# Patient Record
Sex: FEMALE | Race: WHITE | NOT HISPANIC OR LATINO | Employment: OTHER | URBAN - METROPOLITAN AREA
[De-identification: names, ages, dates, MRNs, and addresses within clinical notes are randomized per-mention and may not be internally consistent; named-entity substitution may affect disease eponyms.]

---

## 2017-09-06 ENCOUNTER — HOSPITAL ENCOUNTER (OUTPATIENT)
Dept: LAB | Facility: HOSPITAL | Age: 72
Setting detail: SPECIMEN
Discharge: HOME | End: 2017-09-06
Attending: INTERNAL MEDICINE | Admitting: INTERNAL MEDICINE

## 2019-08-01 RX ORDER — ESTRADIOL 0.5 MG/1
TABLET ORAL
COMMUNITY
Start: 2014-04-15

## 2019-08-01 RX ORDER — MULTIVIT,IRON,MINERALS/LUTEIN
TABLET ORAL
COMMUNITY
Start: 2014-04-15 | End: 2019-08-09

## 2019-08-01 RX ORDER — BIOTIN 1 MG
TABLET ORAL
COMMUNITY
Start: 2014-04-15 | End: 2019-08-09

## 2019-08-01 RX ORDER — CHLORAL HYDRATE 500 MG
CAPSULE ORAL
COMMUNITY
Start: 2017-09-11 | End: 2019-08-09

## 2019-08-01 RX ORDER — MULTIVITAMIN WITH IRON
TABLET ORAL
COMMUNITY
Start: 2017-09-11 | End: 2019-08-09

## 2019-08-09 ENCOUNTER — OFFICE VISIT (OUTPATIENT)
Dept: ENDOCRINOLOGY | Facility: CLINIC | Age: 74
End: 2019-08-09

## 2019-08-09 VITALS
SYSTOLIC BLOOD PRESSURE: 105 MMHG | WEIGHT: 130 LBS | DIASTOLIC BLOOD PRESSURE: 65 MMHG | OXYGEN SATURATION: 99 % | HEART RATE: 68 BPM | HEIGHT: 65 IN | BODY MASS INDEX: 21.66 KG/M2

## 2019-08-09 DIAGNOSIS — M81.0 AGE-RELATED OSTEOPOROSIS WITHOUT CURRENT PATHOLOGICAL FRACTURE: ICD-10-CM

## 2019-08-09 DIAGNOSIS — E83.51 HYPOCALCEMIA: ICD-10-CM

## 2019-08-09 DIAGNOSIS — E89.0 POSTSURGICAL HYPOTHYROIDISM: ICD-10-CM

## 2019-08-09 DIAGNOSIS — E04.2 MULTINODULAR GOITER: Primary | ICD-10-CM

## 2019-08-09 DIAGNOSIS — E05.90 HYPERTHYROIDISM: ICD-10-CM

## 2019-08-09 PROCEDURE — 99214 OFFICE O/P EST MOD 30 MIN: CPT | Performed by: INTERNAL MEDICINE

## 2019-08-09 RX ORDER — LEVOTHYROXINE SODIUM 0.03 MG/1
25 TABLET ORAL DAILY
Qty: 30 TABLET | Refills: 4 | Status: SHIPPED | OUTPATIENT
Start: 2019-08-09 | End: 2019-10-24 | Stop reason: SDUPTHER

## 2019-08-09 NOTE — PATIENT INSTRUCTIONS
Start levothyroxine 25 mcg p.o. daily  Please arrange for thyroid ultrasound in next few days  Please resume your calcium with vitamin D supplementation  Please get us a copy of your bone mineral density report  Please follow-up in 6 to 8 weeks with labs.

## 2019-08-09 NOTE — PROGRESS NOTES
Endocrine Progress Note Outpatient     Patient Care Team:  Isacc Nichols MD as PCP - General    Chief Complaint: Follow-up thyroid    HPI: 74-year-old female who was last seen here in September 2017.  She initially was seen here for hyperthyroidism due to a toxic nodule on the right side of thyroid.  She underwent right thyroidectomy on October 28, 2014 at [facility] by Dr. pancho grier.  Pathology was benign.  She became euthyroid after that and was last seen in September 17 at that time her TSH was normal.  Not on any thyroid medications.  Osteoporosis: Taking calcium with vitamin D supplementation.  She is a scheduled for a bone mineral density in September 2019.  Multiple thyroid nodules: She also have left-sided thyroid nodule at 1.2 cm and a 7 mm and a 6 mm.  Last ultrasound was 2016.  No family history of thyroid cancer and no history of radiation exposure.  She denies dysphagia or choking but she has some change in the voice or hoarseness.    Past Medical History:   Diagnosis Date   • Goiter    • Hyperthyroidism        Social History     Socioeconomic History   • Marital status:      Spouse name: Not on file   • Number of children: Not on file   • Years of education: Not on file   • Highest education level: Not on file   Tobacco Use   • Smoking status: Never Smoker   Substance and Sexual Activity   • Alcohol use: No     Frequency: Never       Family History   Problem Relation Age of Onset   • Cancer Sister         breast       No Known Allergies    ROS:   Constitutional:  Denies fatigue, tiredness.    Eyes:  Denies change in visual acuity   HENT:  Denies nasal congestion or sore throat   Respiratory: denies cough, shortness of breath.   Cardiovascular:  denies chest pain, edema   GI:  Denies abdominal pain, nausea, vomiting.   Musculoskeletal:  Denies back pain or joint pain   Integument:  Denies dry skin and rash   Neurologic:  Denies headache, focal weakness or sensory changes    Endocrine:  Denies polyuria or polydipsia   Psychiatric:  Denies depression or anxiety      Vitals:    08/09/19 0841   BP: 105/65   Pulse: 68   SpO2: 99%       Physical Exam:  GEN: NAD, conversant  EYES: EOMI, PERRL, no conjunctival erythema  NECK: Left side of thyroid is palpable, full ROM   CV: RRR, no murmurs/rubs/gallops, no peripheral edema  LUNG: CTAB, no wheezes/rales/ronchin  SKIN: no rashes, no acanthosis  MSK: no deformities, full ROM of all extremities  NEURO: no tremors, DTR normal  PSYCH: AOX3, appropriate mood, affect normal      Labs from June 13, 2019 reviewed showed a white count of 4.1, hemoglobin 13.7, have a good 40, platelet count is 154, TSH 4.6, T4 7.1, sodium 139, potassium 4.0, chloride 105, 0-85, glucose 85, CO2 29, BUN 16, creatinine 0.8, calcium was low at 8.2, MND 54.3.  Medication Review: Reviewed.       Current Outpatient Medications:   •  Calcium Carbonate-Vitamin D (CALCIUM 600+D) 600-200 MG-UNIT tablet, CALCIUM 600+D 600-200 MG-UNIT TABS, Disp: , Rfl:   •  estradiol (ESTRACE) 0.5 MG tablet, ESTRADIOL 0.5 MG TABS, Disp: , Rfl:   •  Glucosamine HCl (GLUCOSAMINE PO), GLUCOSAMINE CAPS, Disp: , Rfl:   •  Multiple Vitamins-Minerals (MACULAR VITAMIN BENEFIT PO), Take 100 mg by mouth 2 (Two) Times a Day., Disp: , Rfl:       Assessment/Plan   1. Hyperthyroidism: Secondary to right side of toxic thyroid nodule.  She is a status post surgery in 2014 and had remained euthyroid until recently when she was told that her thyroid function was off and she is here for follow-up.  We are trying to get those reports from her primary care physician's office.  2.  Hypothyroidism: She has a mild high TSH, I will add levothyroxine 25 mcg p.o. daily.  3.  Thyroid nodules: Able to palpate the left side thyroid nodule, I will get thyroid ultrasound for further evaluation  4. Osteoporosis: She is a scheduled for BMD in September 2019, she is currently taking calcium with vitamin D supplementation.  5.   "Hypocalcemia: Wise to take calcium with vitamin D supplementation and will follow CMP.            Guillermo Perdue MD FACE.  06/15/19  4:34 PM      EMR Dragon / transcription disclaimer:     \"Dictated utilizing Dragon dictation\".                 "

## 2019-08-15 ENCOUNTER — HOSPITAL ENCOUNTER (OUTPATIENT)
Dept: ULTRASOUND IMAGING | Facility: HOSPITAL | Age: 74
Discharge: HOME | End: 2019-08-15
Admitting: INTERNAL MEDICINE

## 2019-08-15 DIAGNOSIS — E05.90 HYPERTHYROIDISM: ICD-10-CM

## 2019-08-15 DIAGNOSIS — M81.0 AGE-RELATED OSTEOPOROSIS WITHOUT CURRENT PATHOLOGICAL FRACTURE: ICD-10-CM

## 2019-08-15 DIAGNOSIS — E83.51 HYPOCALCEMIA: ICD-10-CM

## 2019-08-15 DIAGNOSIS — E04.2 MULTINODULAR GOITER: ICD-10-CM

## 2019-08-15 DIAGNOSIS — E89.0 POSTSURGICAL HYPOTHYROIDISM: ICD-10-CM

## 2019-08-15 PROCEDURE — 76536 US EXAM OF HEAD AND NECK: CPT

## 2019-08-19 DIAGNOSIS — E89.0 POSTSURGICAL HYPOTHYROIDISM: ICD-10-CM

## 2019-08-19 DIAGNOSIS — E04.2 MULTINODULAR GOITER: Primary | ICD-10-CM

## 2019-08-22 ENCOUNTER — TELEPHONE (OUTPATIENT)
Dept: ENDOCRINOLOGY | Facility: CLINIC | Age: 74
End: 2019-08-22

## 2019-08-27 ENCOUNTER — TELEPHONE (OUTPATIENT)
Dept: ENDOCRINOLOGY | Facility: CLINIC | Age: 74
End: 2019-08-27

## 2019-08-27 NOTE — TELEPHONE ENCOUNTER
Called pt with appt date and time for thyroid biopsy scheduled 9/3 @ 1:00 at [facility]. Pt will need to arrive at 12:15 for regisatration and take ins cards, ID and a current med list with dosages. Pt is not on any blood thinners and was advised to not take any NSAIDS for 7 days before her appt. Scheduling documents faxed and pt given facility location and contact info.

## 2019-09-03 ENCOUNTER — HOSPITAL ENCOUNTER (OUTPATIENT)
Dept: ULTRASOUND IMAGING | Facility: HOSPITAL | Age: 74
Discharge: HOME | End: 2019-09-03
Admitting: INTERNAL MEDICINE

## 2019-09-03 DIAGNOSIS — E04.2 MULTINODULAR GOITER: ICD-10-CM

## 2019-09-03 DIAGNOSIS — E89.0 POSTSURGICAL HYPOTHYROIDISM: ICD-10-CM

## 2019-09-03 PROCEDURE — 88305 TISSUE EXAM BY PATHOLOGIST: CPT | Performed by: INTERNAL MEDICINE

## 2019-09-03 PROCEDURE — 88173 CYTOPATH EVAL FNA REPORT: CPT | Performed by: INTERNAL MEDICINE

## 2019-09-03 PROCEDURE — 25010000003 LIDOCAINE 1 % SOLUTION: Performed by: INTERNAL MEDICINE

## 2019-09-03 RX ORDER — LIDOCAINE HYDROCHLORIDE 10 MG/ML
10 INJECTION, SOLUTION INFILTRATION; PERINEURAL ONCE
Status: COMPLETED | OUTPATIENT
Start: 2019-09-03 | End: 2019-09-03

## 2019-09-03 RX ADMIN — LIDOCAINE HYDROCHLORIDE 5 ML: 10 INJECTION, SOLUTION INFILTRATION; PERINEURAL at 13:45

## 2019-09-04 LAB
LAB AP CASE REPORT: NORMAL
LAB AP DIAGNOSIS COMMENT: NORMAL
LAB AP NON-GYN SPECIMEN ADEQUACY: NORMAL
PATH REPORT.FINAL DX SPEC: NORMAL
PATH REPORT.GROSS SPEC: NORMAL

## 2019-09-11 ENCOUNTER — TELEPHONE (OUTPATIENT)
Dept: ENDOCRINOLOGY | Facility: CLINIC | Age: 74
End: 2019-09-11

## 2019-10-24 ENCOUNTER — OFFICE VISIT (OUTPATIENT)
Dept: ENDOCRINOLOGY | Facility: CLINIC | Age: 74
End: 2019-10-24

## 2019-10-24 VITALS
SYSTOLIC BLOOD PRESSURE: 112 MMHG | WEIGHT: 124 LBS | BODY MASS INDEX: 21.97 KG/M2 | HEIGHT: 63 IN | DIASTOLIC BLOOD PRESSURE: 72 MMHG

## 2019-10-24 DIAGNOSIS — E04.2 MULTINODULAR GOITER: ICD-10-CM

## 2019-10-24 DIAGNOSIS — M81.0 AGE-RELATED OSTEOPOROSIS WITHOUT CURRENT PATHOLOGICAL FRACTURE: ICD-10-CM

## 2019-10-24 DIAGNOSIS — E83.51 HYPOCALCEMIA: ICD-10-CM

## 2019-10-24 DIAGNOSIS — E89.0 POSTSURGICAL HYPOTHYROIDISM: Primary | ICD-10-CM

## 2019-10-24 PROCEDURE — 99214 OFFICE O/P EST MOD 30 MIN: CPT | Performed by: INTERNAL MEDICINE

## 2019-10-24 RX ORDER — LEVOTHYROXINE SODIUM 0.03 MG/1
25 TABLET ORAL DAILY
Qty: 90 TABLET | Refills: 4 | Status: SHIPPED | OUTPATIENT
Start: 2019-10-24 | End: 2020-10-16 | Stop reason: SDUPTHER

## 2019-10-24 RX ORDER — ALENDRONATE SODIUM 70 MG/1
70 TABLET ORAL
Qty: 4 TABLET | Refills: 11 | Status: SHIPPED | OUTPATIENT
Start: 2019-10-24 | End: 2020-10-16

## 2019-10-24 NOTE — PROGRESS NOTES
Endocrine Progress Note Outpatient     Patient Care Team:  Isacc Nichols MD as PCP - General    Chief Complaint: Follow-up thyroid    HPI: 74-year-old female who was last seen here in September 2017.  She initially was seen here for hyperthyroidism due to a toxic nodule on the right side of thyroid.  She underwent right thyroidectomy on October 28, 2014 at [facility] by Dr. Kwan.  Pathology was benign.  She developed hypothyroidism back in August 2019 with a mild high TSH, she was started on levothyroxine 25 mcg p.o. daily which she is taking now.  Denies any hypothyroid complaints at this time.    Osteoporosis: Taking calcium with vitamin D supplementation.    Multiple thyroid nodules: Ultrasound from August 2019 showed increase in the left side of thyroid nodule at 1.5 cm which she subsequently underwent biopsy and the pathology was benign.  No family history of thyroid cancer and no history of radiation exposure.  She denies dysphagia or choking but she has some change in the voice or hoarseness.    Past Medical History:   Diagnosis Date   • Goiter    • Hyperthyroidism        Social History     Socioeconomic History   • Marital status:      Spouse name: Not on file   • Number of children: Not on file   • Years of education: Not on file   • Highest education level: Not on file   Tobacco Use   • Smoking status: Never Smoker   • Smokeless tobacco: Never Used   Substance and Sexual Activity   • Alcohol use: No     Frequency: Never   • Drug use: No   • Sexual activity: Defer       Family History   Problem Relation Age of Onset   • Cancer Sister         breast       No Known Allergies    ROS:   Constitutional:  Denies fatigue, tiredness.    Eyes:  Denies change in visual acuity   HENT:  Denies nasal congestion or sore throat   Respiratory: denies cough, shortness of breath.   Cardiovascular:  denies chest pain, edema   GI:  Denies abdominal pain, nausea, vomiting.   Musculoskeletal:  Denies  back pain or joint pain   Integument:  Denies dry skin and rash   Neurologic:  Denies headache, focal weakness or sensory changes   Endocrine:  Denies polyuria or polydipsia   Psychiatric:  Denies depression or anxiety      Vitals:    10/24/19 1034   BP: 112/72       Physical Exam:  GEN: NAD, conversant  EYES: EOMI, PERRL, no conjunctival erythema  NECK: Left side of thyroid is palpable, full ROM   CV: RRR, no murmurs/rubs/gallops, no peripheral edema  LUNG: CTAB, no wheezes/rales/ronchin  SKIN: no rashes, no acanthosis  MSK: no deformities, full ROM of all extremities  NEURO: no tremors, DTR normal  PSYCH: AOX3, appropriate mood, affect normal    US Thyroid [[id number]] (Order [id number])   Order   Status: Final result   Appointment Information     PACS Images      Radiology Images   Study Result     US THYROID-     Date of Exam: 8/15/2019 8:20 AM     Indication: Thyroid nodules.     Comparison Exams: August 16, 2016     Technique: Thyroid ultrasound     FINDINGS:  The right thyroid lobe is surgically absent. The left thyroid lobe  measures 3.6 x 1.8 x 1 point centimeter. The isthmus is surgically  absent as well.     Within the lower left thyroid lobe is a mostly solid hypoechoic nodule  measuring 1.5 x 1.5 x 0.8 cm that has increased in size, previously 1.0  x 0.5 x 1.2 cm, TI-RADS 4.      In the mid to lower left thyroid lobe is a solid hypoechoic nodule  measuring 6 x 6 x 3 mm, not significantly changed, TI-RADS 4.     Within the inferior left thyroid lobe is a mixed solid and cystic nodule  measuring 1.0 x 0.9 x 0.6 cm that has slightly increased in size,  previously 0.7 x 0.5 x 0.7 cm, TI-RADS 3.     IMPRESSION:  1.Thyroid nodules as described above. A 1.5 cm left thyroid nodule has  increased in size from prior exam, and percutaneous biopsy is  recommended.  2.Status post right hemithyroidectomy.     Electronically Signed By-DR. Loco Bruce MD On:8/15/2019 9:29 AM  This report was finalized on  "[id number] by DR. Loco Bruce MD.     Fine Needle Aspiration [[id number]] (Order [id number])   Order   Date: 9/3/2019 [facility] Released By: Ivette Angel (auto-released) Authorizing: Morgan Garcia III, MD   Reprint Order Requisition     Fine Needle Aspiration (Order #[id number]) on 9/3/19       Fine Needle Aspiration: [id number]   Order: [id number]   Status:  Final result   Visible to patient:  No (Not Released)   Next appt:  None   Component    Case Report   Medical Cytology Report                           Case: [id number]                                   Authorizing Provider:  Morgan Garcia III, Collected:           09/03/2019 12:00 AM                                  MD                                                                            [facility] Received:            09/03/2019 12:56 PM                                  SOUND                                                                         Pathologist:           Loco Angela MD                                                            Specimen:    Thyroid, left 1.5 cm                                                                       Final Diagnosis   Left thyroid nodule, fine needle aspiration (smears and cell block):    Consistent with a benign follicular nodule (Hansen class II)      SALLIE/sms    Electronically signed by Loco Angela MD on 9/4/2019 at 0753   Comment    There are multiple bland follicular cell groups along with colloid and occasional foamy macrophages identified. No malignant cells are seen.    Specimen Adequacy Satisfactory for evaluation    Gross Description    Part #1: Received in 95% alcohol are four slides designated \"Fine needle aspiration of left thyroid nodule.\" These are Pap stained for evaluation. Also received are four air dried slides from the same site. These are stained with Mackey/Giemsa stain for immediate " "evaluation.     Part 2: Received in formalin designated \"Left thyroid\" is a conical-shaped portion of reddish gelatinous material measuring 1 x 1 x 0.5 cm. It is sectioned and submitted in one cassette.      [facility]           Labs from October 2019 showed a TSH of 2.54, free T4 1.03, serum calcium 8.9, BUN 14, creatinine 0.8.  Labs from June 13, 2019 reviewed showed a white count of 4.1, hemoglobin 13.7, have a good 40, platelet count is 154, TSH 4.6, T4 7.1, sodium 139, potassium 4.0, chloride 105, 0-85, glucose 85, CO2 29, BUN 16, creatinine 0.8, calcium was low at 8.2, MND 54.3.  Medication Review: Reviewed.       Current Outpatient Medications:   •  Calcium Carbonate-Vitamin D (CALCIUM 600+D) 600-200 MG-UNIT tablet, once daily, Disp: , Rfl:   •  estradiol (ESTRACE) 0.5 MG tablet, once daily, Disp: , Rfl:   •  levothyroxine (SYNTHROID, LEVOTHROID) 25 MCG tablet, Take 1 tablet by mouth Daily., Disp: 30 tablet, Rfl: 4  •  Multiple Vitamins-Minerals (MACULAR VITAMIN BENEFIT PO), Take 100 mg by mouth 2 (Two) Times a Day., Disp: , Rfl:       Assessment/Plan     1.  Hypothyroidism: She is currently on levothyroxine 25 mcg p.o. daily and TSH is normal.  We will continue levothyroxine.      2.  Thyroid nodules: Left-sided thyroid nodule had increased in size and it underwent biopsy and pathology was benign.      3. Osteoporosis: She is a scheduled for BMD in September 2019, she is currently taking calcium with vitamin D supplementation.  Her bone mineral density done in September 2019 showed a T score of -2.5 at femoral neck and a T score of -1.2 at the AP spine.  She is high risk for fracture, we talked about treatment options including oral bisphosphonates, IV bisphosphonates, IV subcutaneous Prolia.  At this time we have decided to go with oral bisphosphonate alendronate 70 mg once a week.  She is advised to take the pill by itself on an empty stomach with a full glass of water in the morning and do not lay down or " "eat anything for at least 30 minutes after she has taken the pills.  Is also advised to continue calcium and vitamin D supplementation.    4.  Hypocalcemia: On calcium and vitamin D supplementation.  Serum calcium is normal at 8.9.            Guillermo Perdue MD FACE.  06/15/19  4:34 PM      EMR Dragon / transcription disclaimer:     \"Dictated utilizing Dragon dictation\".                 "

## 2019-10-24 NOTE — PATIENT INSTRUCTIONS
Start Fosamax 70 mg once a week and follow instructions as I mentioned to you in detail  Continue levothyroxine current dose  Follow-up in 1 year with labs.    Continue calcium and vitamin D supplementation.

## 2020-02-19 ENCOUNTER — TRANSCRIBE ORDERS (OUTPATIENT)
Dept: PHYSICAL THERAPY | Facility: CLINIC | Age: 75
End: 2020-02-19

## 2020-02-19 ENCOUNTER — TREATMENT (OUTPATIENT)
Dept: PHYSICAL THERAPY | Facility: CLINIC | Age: 75
End: 2020-02-19

## 2020-02-19 DIAGNOSIS — M99.05 SEGMENTAL AND SOMATIC DYSFUNCTION OF PELVIC REGION: ICD-10-CM

## 2020-02-19 DIAGNOSIS — M25.562 CHRONIC PAIN OF BOTH KNEES: ICD-10-CM

## 2020-02-19 DIAGNOSIS — M17.11 PATELLOFEMORAL ARTHRITIS OF RIGHT KNEE: ICD-10-CM

## 2020-02-19 DIAGNOSIS — G89.29 CHRONIC PAIN OF BOTH KNEES: ICD-10-CM

## 2020-02-19 DIAGNOSIS — M17.12 PATELLOFEMORAL ARTHRITIS OF LEFT KNEE: Primary | ICD-10-CM

## 2020-02-19 DIAGNOSIS — M25.561 CHRONIC PAIN OF BOTH KNEES: ICD-10-CM

## 2020-02-19 PROCEDURE — 97162 PT EVAL MOD COMPLEX 30 MIN: CPT | Performed by: PHYSICAL THERAPIST

## 2020-02-19 PROCEDURE — 97110 THERAPEUTIC EXERCISES: CPT | Performed by: PHYSICAL THERAPIST

## 2020-02-19 NOTE — PROGRESS NOTES
Physical Therapy Initial Evaluation and Plan of Care    Patient: Elijah Westbrook   : 1945  Diagnosis/ICD-10 Code:  Patellofemoral arthritis of left knee [M17.12]  Referring practitioner: Nicole Saunders MD  Date of Initial Visit: 2020  Today's Date: 2020  Patient seen for 1 sessions           Subjective Questionnaire: LEFS: 64/80      Subjective Evaluation    History of Present Illness  Mechanism of injury: Pt reports several month hx of B ant knee pain. Pt also reports some numbness in R plantar surface of toes at times, generally with sitting (pt notes MD is not aware of that). Pt states she was walking 5 miles/day on a treadmill and it was hurting so she cut back to 2 miles/day and since she's seen MD she's increased back to 2.5 miles/day.     Pt denies falls in the last 12 months, but did come close last fall wearing milking boots on uneven ground. Pt thinks that could have been what aggravated it.     Pain: 0/10 current, 0/10 at best, 7/10 at worst    Aggravating/functional factors: sitting, twisting, rising, in/out of vehicle, up/down stairs (goes sideways at times due to pain & use of rail), squatting, bending, stooping, prolonged walking    Relieving factors: Ibuprofen, moving around    Social Hx: lives with spouse, homemaker, stairs to basement (rails) & into garage (no rail)    Meds per list in chart. Pt reports she took herself off of the Fosomax because it was bothering her stomach.     Treatments  Discharged from (in last 30 days) comments: No.     Patient Goals  Patient goals for therapy: decreased pain, increased strength, independence with ADLs/IADLs, increased motion, improved balance and return to sport/leisure activities  Patient goal: walking regimen      Objective     Vitals: /58; HR 69; SpO2 96%    Palpation: tender R popliteal fossa; no other TTP noted B LEs; L PINN; B outflares    Observation: appears lat shifted in standing/walking, crepitus with rising and some knee  movements in clinic    AROM (in degrees):  Hip flex: 115 L/110 R  Hip IR/ER: WFL  Knee flex: 110 B (heelslide)  Knee ext lag: 10 L/15 R    PROM (in degrees):   Knee ext la L/10 R    Strength (in available range):   Hip flex 4- B  Hip ext 4+ B  Hip abd/add in H/L [place] min/mod resistance  Hip IR/ER 5- B  Knee flex 4- B  Knee ext 4  Ankle DF 4+ B  Ankle IV 4 B  Ankle EV 4+ L/4- R  Gt toe ext 4+ B    Flexibility: min to min/mod restrictions throughout the hip/knee & ankle DF    Special Tests: Varus/valgus stress tests (-) B; Ant/post drawer (-); Thessaly's painful med knee L; SLR painful in LEs only & worsened with addition of DF (Braggard's) & popliteal fossa tender as well   Balance: SLS 14 s L/8 s R, level ground, no UE support; SBA/CG to catch from losing balance    Gait: no AD; normal kathleen; appears lat shifted in the spine; slight circumduction & WB through     Assessment & Plan     Assessment  Impairments: abnormal coordination, abnormal gait, abnormal muscle firing, abnormal muscle tone, abnormal or restricted ROM, activity intolerance, impaired balance, impaired physical strength, lacks appropriate home exercise program, pain with function, safety issue and weight-bearing intolerance  Assessment details: The patient is a 74 y.o. female who presents to physical therapy today for patellofemoral arthritis. Pt also appears with possible LB involvement due to severity of pain levels into LEs with SLR, numbness in R toes, and tenderness into R popliteal fossa. Upon initial evaluation, the patient demonstrates the above & following impairments: pain, reduced posture, SI/IS dysfunction, decreased ROM/flexibility, strength core/LEs, gait, balance and function. Due to these impairments, the patient is unable to/limited with: sitting, twisting, rising, in/out of vehicle, up/down stairs, squatting, bending, stooping, prolonged walking. The patient would benefit from skilled PT services to address functional  limitations and impairments and to improve patient quality of life.      Barriers to therapy: Osteopenia/OP, arthritis, hyperthyroidism & reduced balance could all affect PT Rx/progress/outcomes if exacerbated/unregulated   Prognosis: good  Functional Limitations: lifting, sleeping, walking, uncomfortable because of pain, moving in bed, sitting, standing and stooping  Goals  Plan Goals: STGs in 4 weeks:  Decrease pain to 4-5/10 on average  Increase trunk/LE ROM by 5-10 degrees where limited as much  Increase LE strength to 4/5  Assess/improve pelvic/sacral alignment prn    LTGs by discharge  Increase trunk/LE ROM to WFL/WNL  Increase LE strength to 5/5   Pt will be able to ascend/descend stairs reciprocally with or without use of rail(s) and with minimal difficulty or pain  Pt will be able to sit/drive/ride for 30-60 mins without difficulty or pain  Pt will be able to walk 30-60 mins for grocery shopping without difficulty, pain or LOB  Pt will be able to get in/out of vehicles and chairs without difficulty or increased pain  Pt will be able to bend/stoop/squat sufficiently for ADLs including house cleaning.      Plan  Therapy options: will be seen for skilled physical therapy services  Planned modality interventions: cryotherapy, thermotherapy (hydrocollator packs) and electrical stimulation/Russian stimulation  Other planned modality interventions: Dry Needling  Planned therapy interventions: manual therapy, neuromuscular re-education, postural training, strengthening, stretching, therapeutic activities, transfer training, abdominal trunk stabilization, ADL retraining, body mechanics training, home exercise program, gait training, functional ROM exercises, flexibility, soft tissue mobilization, motor coordination training and balance/weight-bearing training  Other planned therapy interventions: MET correx for improving pelvic/sacral alignment prn  Duration in visits: 12  Treatment plan discussed with:  patient      History # of Personal Factors and/or Comorbidities: HIGH (3+)  Examination of Body System(s): # of elements: HIGH (4+)  Clinical Presentation: EVOLVING  Clinical Decision Making: MODERATE      Timed:         Manual Therapy:         mins  02574;     Therapeutic Exercise:   11     mins  40976;     Neuromuscular Matthieu:        mins  43409;    Therapeutic Activity:          mins  68841;     Gait Training:           mins  57892;     Ultrasound:         mins  71226;    Ionto                                   mins   53567  Self Care                           mins   68999  Canalith Repos         mins 25364      Un-Timed:  Electrical Stimulation:         mins  71277 ( );  Dry Needling         mins self-pay  Traction          mins 87104  Low Eval          Mins  72328  Mod Eval     40     Mins  59291  High Eval                            Mins  05299  Re-Eval                               mins  02905        Timed Treatment:  11    mins   Total Treatment:     51  mins    PT SIGNATURE: Any Kamara, PT   DATE TREATMENT INITIATED: 2/19/2020    Medicare Initial Certification  Certification Period: 5/19/2020  I certify that the therapy services are furnished while this patient is under my care.  The services outlined above are required by this patient, and will be reviewed every 90 days.     PHYSICIAN:       DATE:     Please sign and return via fax to [phone]  .. Thank you, [facility], [place], [place] office.

## 2020-02-25 ENCOUNTER — TREATMENT (OUTPATIENT)
Dept: PHYSICAL THERAPY | Facility: CLINIC | Age: 75
End: 2020-02-25

## 2020-02-25 DIAGNOSIS — M25.562 CHRONIC PAIN OF BOTH KNEES: ICD-10-CM

## 2020-02-25 DIAGNOSIS — M17.11 PATELLOFEMORAL ARTHRITIS OF RIGHT KNEE: ICD-10-CM

## 2020-02-25 DIAGNOSIS — M17.12 PATELLOFEMORAL ARTHRITIS OF LEFT KNEE: Primary | ICD-10-CM

## 2020-02-25 DIAGNOSIS — G89.29 CHRONIC PAIN OF BOTH KNEES: ICD-10-CM

## 2020-02-25 DIAGNOSIS — M99.05 SEGMENTAL AND SOMATIC DYSFUNCTION OF PELVIC REGION: ICD-10-CM

## 2020-02-25 DIAGNOSIS — M25.561 CHRONIC PAIN OF BOTH KNEES: ICD-10-CM

## 2020-02-25 PROCEDURE — 97110 THERAPEUTIC EXERCISES: CPT | Performed by: PHYSICAL THERAPIST

## 2020-02-25 NOTE — PROGRESS NOTES
Physical Therapy Daily Progress Note    VISIT#: 2    Subjective   Elijah Westbrook reports no pain at present, but states she still gets pain with sitting up to ~7-8/10. Pt has no questions on HEP.    Pain Rating (0-10): 0    Objective     See Exercise Logs for complete treatment.     Patient Education: cues for therex     Assessment/Plan Pt tolerated progression of therex fairly well, but was a little sore and fatigued by end of session. Pt declined modalities at end of session.     Progress per Plan of Care and Progress strengthening /stabilization /functional activity        Timed:         Manual Therapy:         mins  08578;     Therapeutic Exercise:    30     mins  42362;     Neuromuscular Matthieu:        mins  17392;    Therapeutic Activity:          mins  97925;     Gait Training:          mins  71458;     Ultrasound:          mins  92019;    Ionto                                   mins   77600  Self Care                           mins   90382  Canalith Repos                   mins  87587    Un-Timed:  Electrical Stimulation:         mins  52318 ( );  Dry Needling          mins self-pay  Traction          mins 18782  Low Eval          Mins  49558  Mod Eval          Mins  04140  High Eval                            Mins  47041  Re-Eval                               mins  18714    Timed Treatment:   30   mins   Total Treatment:     30   mins    Any Kamara, PT  [place] License # [id number]  Physical Therapist

## 2020-02-27 ENCOUNTER — TREATMENT (OUTPATIENT)
Dept: PHYSICAL THERAPY | Facility: CLINIC | Age: 75
End: 2020-02-27

## 2020-02-27 DIAGNOSIS — M25.562 CHRONIC PAIN OF BOTH KNEES: ICD-10-CM

## 2020-02-27 DIAGNOSIS — M17.12 PATELLOFEMORAL ARTHRITIS OF LEFT KNEE: Primary | ICD-10-CM

## 2020-02-27 DIAGNOSIS — G89.29 CHRONIC PAIN OF BOTH KNEES: ICD-10-CM

## 2020-02-27 DIAGNOSIS — M17.11 PATELLOFEMORAL ARTHRITIS OF RIGHT KNEE: ICD-10-CM

## 2020-02-27 DIAGNOSIS — M99.05 SEGMENTAL AND SOMATIC DYSFUNCTION OF PELVIC REGION: ICD-10-CM

## 2020-02-27 DIAGNOSIS — M25.561 CHRONIC PAIN OF BOTH KNEES: ICD-10-CM

## 2020-02-27 PROCEDURE — 97110 THERAPEUTIC EXERCISES: CPT | Performed by: PHYSICAL THERAPIST

## 2020-02-27 NOTE — PROGRESS NOTES
Physical Therapy Daily Progress Note    VISIT#: 3    Subjective   Elijah Westbrook reports that her knees only hurt after she sits for a prolonged period of time with increased pain noted in the evening.   Pain Rating (0/10): 3    Objective     See Exercise, Manual, and Modality Logs for complete treatment.     Patient education: pt instructed to trial moving legs periodically while sitting for prolonged periods of time. Pt also instructed to look into toe spacers/ straighteners following pt expressing concerns for her curling under toes.     Assessment/Plan     Pt continues to progress BLE strengthening and activity tolerance with decreased rest breaks required throughout the session.     Plan  Progress per Plan of Care and Progress strengthening /stabilization /functional activity            Timed:         Manual Therapy:         mins  94744;     Therapeutic Exercise:    25     mins  76512;     Neuromuscular Matthieu:        mins  28027;    Therapeutic Activity:          mins  91371;     Gait Training:           mins  83998;     Ultrasound:          mins  21628;    Ionto                                   mins   82702  Self Care                            mins   26022    Un-Timed:  Electrical Stimulation:         mins  29739 ( );  Dry Needling          mins self-pay  Traction          mins 00455  Low Eval          Mins  14239  Mod Eval          Mins  24029  High Eval                            Mins  31823  Re-Eval                               mins  56942    Timed Treatment:   25   mins   Total Treatment:     25   mins    Rosalia Handy PT, DPT  Physical Therapist

## 2020-03-05 ENCOUNTER — TREATMENT (OUTPATIENT)
Dept: PHYSICAL THERAPY | Facility: CLINIC | Age: 75
End: 2020-03-05

## 2020-03-05 DIAGNOSIS — M17.11 PATELLOFEMORAL ARTHRITIS OF RIGHT KNEE: ICD-10-CM

## 2020-03-05 DIAGNOSIS — G89.29 CHRONIC PAIN OF BOTH KNEES: ICD-10-CM

## 2020-03-05 DIAGNOSIS — M99.05 SEGMENTAL AND SOMATIC DYSFUNCTION OF PELVIC REGION: ICD-10-CM

## 2020-03-05 DIAGNOSIS — M25.562 CHRONIC PAIN OF BOTH KNEES: ICD-10-CM

## 2020-03-05 DIAGNOSIS — M25.561 CHRONIC PAIN OF BOTH KNEES: ICD-10-CM

## 2020-03-05 DIAGNOSIS — M17.12 PATELLOFEMORAL ARTHRITIS OF LEFT KNEE: Primary | ICD-10-CM

## 2020-03-05 PROCEDURE — 97110 THERAPEUTIC EXERCISES: CPT | Performed by: PHYSICAL THERAPIST

## 2020-03-05 PROCEDURE — 97140 MANUAL THERAPY 1/> REGIONS: CPT | Performed by: PHYSICAL THERAPIST

## 2020-03-05 NOTE — PROGRESS NOTES
"Physical Therapy Daily Progress Note    VISIT#: 4    Subjective   Elijah Westbrook reports \"not sure if this is helping.\"  Her R knee locked up on her worse than ever before this morning.  No pain at start of session.  R>L knee pain noted after prolonged sitting when coming to stand.  Cont to do HEP as directed without problems.      Objective   See Exercise, Manual, and Modality Logs for complete treatment.     Assessment/Plan  No significant change in status of symptoms during/after manual today.  [place] exercise progressions/new there-ex today without problems or pain, only muscular fatigue.  No complications today.  Continue current POC and progress as tolerated per PT evaluation.        Timed:  Therapeutic Exercise:    29     mins  46956;     Manual Therapy:    11     mins  42381;       Timed Treatment:   40   mins   Total Treatment:     63   mins    Jay Huggins, PT  [place] License # [id number]  Physical Therapist    "

## 2020-03-10 ENCOUNTER — TREATMENT (OUTPATIENT)
Dept: PHYSICAL THERAPY | Facility: CLINIC | Age: 75
End: 2020-03-10

## 2020-03-10 DIAGNOSIS — M17.11 PATELLOFEMORAL ARTHRITIS OF RIGHT KNEE: ICD-10-CM

## 2020-03-10 DIAGNOSIS — M25.561 CHRONIC PAIN OF BOTH KNEES: ICD-10-CM

## 2020-03-10 DIAGNOSIS — G89.29 CHRONIC PAIN OF BOTH KNEES: ICD-10-CM

## 2020-03-10 DIAGNOSIS — M25.562 CHRONIC PAIN OF BOTH KNEES: ICD-10-CM

## 2020-03-10 DIAGNOSIS — M17.12 PATELLOFEMORAL ARTHRITIS OF LEFT KNEE: Primary | ICD-10-CM

## 2020-03-10 DIAGNOSIS — M99.05 SEGMENTAL AND SOMATIC DYSFUNCTION OF PELVIC REGION: ICD-10-CM

## 2020-03-10 PROCEDURE — 97140 MANUAL THERAPY 1/> REGIONS: CPT | Performed by: PHYSICAL THERAPIST

## 2020-03-10 PROCEDURE — 97110 THERAPEUTIC EXERCISES: CPT | Performed by: PHYSICAL THERAPIST

## 2020-03-10 NOTE — PROGRESS NOTES
Physical Therapy Daily Progress Note/Discharge Note    Patient: Elijah Westbrook   : 1945  Diagnosis/ICD-10 Code:  Patellofemoral arthritis of left knee [M17.12]  Referring practitioner: Nicole Saunders MD  Date of Initial Visit: 2020  Today's Date: 3/10/2020  Patient seen for 5 sessions     Subjective Questionnaire: LEFS: 55/80 (was 64.80 at eval)      Subjective   Elijah Westbrook reports she doesn't feel like this is helping. Pt has been doing exs, but feels it's getting worse. Pt feels like it's better when it's straight. Pt states she fell asleep upright with the knee bent last night and awoke with increased pain in the front of the knee at the knee cap, but normally post lat or post med. Pt took Ibuprofen which helped. Pt has increased walking to 3 miles/day and notes it's a little painful, but doesn't stop her from doing it.     Pain Rating (0-10): 3 at present; 8-9 at worst; 4-5/10 on average    Objective Pt is demonstrating significant improvements in mobility, strength and function overall.     Palpation: tender R popliteal fossa; no other TTP noted B LEs; L PINN; B outflares (corrected alignment with manual)     Observation: appears lat shifted in standing/walking, crepitus with rising and some knee movements in clinic     AROM (in degrees):  Hip flex: 140 L/130 R  Hip IR/ER: WFL  Knee flex: 138 L/142 R (heelslide)  Knee ext la L/5 R     PROM (in degrees):   Knee ext lag: 3 L/3 R     Strength (in available range):   Hip flex 4- to 4 B  Hip ext 5 L/4+ R  Hip abd marcelina mod resistance & add marcelina min/mod resistance in H/L  Hip IR 5- B  Hip ER 5 B  Knee flex 4 B  Knee ext 4+ L/4 R  Ankle DF 4+ B  Ankle IV 4 B  Ankle EV 4+ L/4 R  Gt toe ext 4+ B     Flexibility: min to min/mod restrictions throughout the hip/knee & ankle DF     Special Tests: Varus/valgus stress tests (-) B; Ant/post drawer (-); Thessaly's (+) L; SLR painful in R LE (no back pain) & worsened with addition of DF (Braggard's) &  popliteal fossa tender as well                Balance: SLS 14 s L/8 s R, level ground, no UE support; SBA/CG to catch from losing balance (balance not retested)     Gait: no AD; normal kathleen; appears lat shifted in the spine; slight circumduction & WB through      See Exercise & Manual Logs for complete treatment. Reviewed logs for accuracy.     Patient Education: discussed options for being on hold while consulting with MD, but pt wanted to D/C; also discussed that weather changes may aggravate; discussed that pt may require further testing     Assessment/Plan Pt is demonstrating significant improvements in mobility and strength, but pain levels have increased and function has decreased per LEFS, although pt reports she doesn't feel like the function is worse, it's just not better. Pt has met or partially met all goals. Pt will continue with I HEP as tolerated as long as not worsening pain.     Goals  Plan Goals: STGs in 4 weeks:  Decrease pain to 4-5/10 on average Met  Increase trunk/LE ROM by 5-10 degrees where limited as much Met  Increase LE strength to 4/5 Partially Met  Assess/improve pelvic/sacral alignment prn Met    LTGs by discharge  Increase trunk/LE ROM to WFL/WNL Met  Increase LE strength to 5/5 Partially Met  Pt will be able to ascend/descend stairs reciprocally with or without use of rail(s) and with minimal difficulty or pain Met  Pt will be able to sit/drive/ride for 30-60 mins without difficulty or pain Partially Met  Pt will be able to walk 30-60 mins for grocery shopping without difficulty, pain or LOB Partially Met (pain with prolonged walking, but doesn't stop pt from doing it)  Pt will be able to get in/out of vehicles and chairs without difficulty or increased pain Partially Met  Pt will be able to bend/stoop/squat sufficiently for ADLs including house cleaning. Partially Met    Other D/C at pt request per pain worsening and function not changed. Pt with (+) L Thessaly's & SLR (+) for R LE  pain & neural tension with Braggards or cerv flex. Pt also with tenderness at popliteal fossa. Pt was instr to contact referring to determine if further testing is warranted for the knee and/or the LB.         Timed:         Manual Therapy:    8     mins  52004;     Therapeutic Exercise:    30     mins  53913;     Neuromuscular Matthieu:        mins  01116;    Therapeutic Activity:         mins  38461;     Gait Training:           mins  89065;     Ultrasound:          mins  47291;    Ionto                                   mins   79353  Self Care                            mins   70808  Canalith Repos                   mins  02188    Un-Timed:  Electrical Stimulation:         mins  07390 ( );  Dry Needling         mins self-pay  Traction          mins 70931  Low Eval          Mins  05581  Mod Eval         Mins  86855  High Eval                            Mins  09046  Re-Eval                               mins  58925    Timed Treatment:  38    mins   Total Treatment:     38   mins    Any Kamara, PT  [place] License # [id number]  Physical Therapist

## 2020-08-20 ENCOUNTER — TRANSCRIBE ORDERS (OUTPATIENT)
Dept: ADMINISTRATIVE | Facility: HOSPITAL | Age: 75
End: 2020-08-20

## 2020-08-20 DIAGNOSIS — M79.674 PAIN IN TOES OF BOTH FEET: ICD-10-CM

## 2020-08-20 DIAGNOSIS — I73.9 PVD (PERIPHERAL VASCULAR DISEASE) (HCC): Primary | ICD-10-CM

## 2020-08-20 DIAGNOSIS — M79.675 PAIN IN TOES OF BOTH FEET: ICD-10-CM

## 2020-08-24 ENCOUNTER — HOSPITAL ENCOUNTER (OUTPATIENT)
Dept: CARDIOLOGY | Facility: HOSPITAL | Age: 75
Discharge: HOME | End: 2020-08-24
Admitting: PODIATRIST

## 2020-08-24 DIAGNOSIS — M79.674 PAIN IN TOES OF BOTH FEET: ICD-10-CM

## 2020-08-24 DIAGNOSIS — I73.9 PVD (PERIPHERAL VASCULAR DISEASE) (HCC): ICD-10-CM

## 2020-08-24 DIAGNOSIS — M79.675 PAIN IN TOES OF BOTH FEET: ICD-10-CM

## 2020-08-24 LAB
BH CV LOWER ARTERIAL LEFT ABI RATIO: 1.08
BH CV LOWER ARTERIAL LEFT DORSALIS PEDIS SYS MAX: 160 MMHG
BH CV LOWER ARTERIAL LEFT GREAT TOE SYS MAX: 104 MMHG
BH CV LOWER ARTERIAL LEFT POST TIBIAL SYS MAX: 167 MMHG
BH CV LOWER ARTERIAL LEFT TBI RATIO: 0.67
BH CV LOWER ARTERIAL RIGHT ABI RATIO: 1.1
BH CV LOWER ARTERIAL RIGHT DORSALIS PEDIS SYS MAX: 161 MMHG
BH CV LOWER ARTERIAL RIGHT GREAT TOE SYS MAX: 92 MMHG
BH CV LOWER ARTERIAL RIGHT POST TIBIAL SYS MAX: 171 MMHG
BH CV LOWER ARTERIAL RIGHT TBI RATIO: 0.59
UPPER ARTERIAL LEFT ARM BRACHIAL SYS MAX: 145 MMHG
UPPER ARTERIAL RIGHT ARM BRACHIAL SYS MAX: 155 MMHG

## 2020-08-24 PROCEDURE — 93922 UPR/L XTREMITY ART 2 LEVELS: CPT

## 2020-10-15 RX ORDER — IBUPROFEN 600 MG/1
600 TABLET ORAL
COMMUNITY

## 2020-10-16 ENCOUNTER — OFFICE VISIT (OUTPATIENT)
Dept: ENDOCRINOLOGY | Facility: CLINIC | Age: 75
End: 2020-10-16

## 2020-10-16 VITALS
TEMPERATURE: 97.6 F | WEIGHT: 128 LBS | HEIGHT: 63 IN | OXYGEN SATURATION: 99 % | SYSTOLIC BLOOD PRESSURE: 145 MMHG | DIASTOLIC BLOOD PRESSURE: 70 MMHG | HEART RATE: 57 BPM | BODY MASS INDEX: 22.68 KG/M2

## 2020-10-16 DIAGNOSIS — M81.0 AGE-RELATED OSTEOPOROSIS WITHOUT CURRENT PATHOLOGICAL FRACTURE: ICD-10-CM

## 2020-10-16 DIAGNOSIS — E04.2 MULTIPLE THYROID NODULES: ICD-10-CM

## 2020-10-16 DIAGNOSIS — E89.0 POSTSURGICAL HYPOTHYROIDISM: Primary | ICD-10-CM

## 2020-10-16 DIAGNOSIS — E83.51 HYPOCALCEMIA: ICD-10-CM

## 2020-10-16 PROCEDURE — 99214 OFFICE O/P EST MOD 30 MIN: CPT | Performed by: INTERNAL MEDICINE

## 2020-10-16 RX ORDER — INDOMETHACIN 25 MG/1
25 CAPSULE ORAL
COMMUNITY
End: 2021-10-12

## 2020-10-16 RX ORDER — MULTIVITAMIN WITH IRON
250 TABLET ORAL DAILY
COMMUNITY

## 2020-10-16 RX ORDER — TRAZODONE HYDROCHLORIDE 150 MG/1
150 TABLET ORAL NIGHTLY
COMMUNITY

## 2020-10-16 RX ORDER — LEVOTHYROXINE SODIUM 0.03 MG/1
25 TABLET ORAL DAILY
Qty: 90 TABLET | Refills: 4 | Status: SHIPPED | OUTPATIENT
Start: 2020-10-16 | End: 2021-10-12 | Stop reason: SDUPTHER

## 2020-10-16 NOTE — PROGRESS NOTES
Endocrine Progress Note Outpatient     Patient Care Team:  Isacc Nichols MD as PCP - General  Bear Jung DPM as PCP - Claims Attributed    Chief Complaint: Follow-up thyroid    HPI: 74-year-old female who was last seen here in September 2017.  She initially was seen here for hyperthyroidism due to a toxic nodule on the right side of thyroid.  She underwent right thyroidectomy on October 28, 2014 at [facility] by Dr. Kwan.  Pathology was benign.  She developed hypothyroidism back in August 2019 with a mild high TSH, she was started on levothyroxine 25 mcg p.o. daily which she is taking now.  Denies any hypothyroid complaints at this time.    Osteoporosis: Taking calcium with vitamin D supplementation.  She did try alendronate however she could not tolerate after 6 months so she stopped it.  No fractures since last seen.    Multiple thyroid nodules: Ultrasound from August 2019 showed increase in the left side of thyroid nodule at 1.5 cm which she subsequently underwent biopsy and the pathology was benign.  No family history of thyroid cancer and no history of radiation exposure.  She denies dysphagia or choking but she has some change in the voice or hoarseness.    Hypocalcemia: On calcium supplementation.    Past Medical History:   Diagnosis Date   • Arthritis    • Cellulitis 02/01/2020   • Goiter    • Hyperthyroidism    • Osteopenia    • Osteoporosis        Social History     Socioeconomic History   • Marital status:      Spouse name: Not on file   • Number of children: Not on file   • Years of education: Not on file   • Highest education level: Not on file   Tobacco Use   • Smoking status: Never Smoker   • Smokeless tobacco: Never Used   Substance and Sexual Activity   • Alcohol use: No     Frequency: Never   • Drug use: No   • Sexual activity: Defer       Family History   Problem Relation Age of Onset   • Cancer Sister         breast       No Known Allergies    ROS:   Constitutional:   Denies fatigue, tiredness.    Eyes:  Denies change in visual acuity   HENT:  Denies nasal congestion or sore throat   Respiratory: denies cough, shortness of breath.   Cardiovascular:  denies chest pain, edema   GI:  Denies abdominal pain, nausea, vomiting.   Musculoskeletal:  Denies back pain or joint pain   Integument:  Denies dry skin and rash   Neurologic:  Denies headache, focal weakness or sensory changes   Endocrine:  Denies polyuria or polydipsia   Psychiatric:  Denies depression or anxiety      Vitals:    10/16/20 0937   BP: 145/70   Pulse: 57   Temp: 97.6 °F (36.4 °C)   SpO2: 99%       Physical Exam:  GEN: NAD, conversant  EYES: EOMI, PERRL, no conjunctival erythema  NECK: Left side of thyroid is palpable, full ROM   CV: RRR, no murmurs/rubs/gallops, no peripheral edema  LUNG: CTAB, no wheezes/rales/ronchin  SKIN: no rashes, no acanthosis  MSK: no deformities, full ROM of all extremities  NEURO: no tremors, DTR normal  PSYCH: AOX3, appropriate mood, affect normal      Labs reviewed: Labs from October 8, 2020 showed a sodium 137, potassium 4.1, chloride 105, CO2 24, glucose 83, BUN 20, AST 14, ALT 17, TSH 2.44, free T4 0.96 calcium 8.7.    Medication Review: Reviewed.       Current Outpatient Medications:   •  Calcium Carbonate-Vitamin D (CALCIUM 600+D) 600-200 MG-UNIT tablet, once daily, Disp: , Rfl:   •  estradiol (ESTRACE) 0.5 MG tablet, once daily, Disp: , Rfl:   •  ibuprofen (ADVIL,MOTRIN) 600 MG tablet, Take 600 mg by mouth., Disp: , Rfl:   •  indomethacin (INDOCIN) 25 MG capsule, Take 25 mg by mouth 3 (Three) Times a Day With Meals., Disp: , Rfl:   •  levothyroxine (SYNTHROID, LEVOTHROID) 25 MCG tablet, Take 1 tablet by mouth Daily., Disp: 90 tablet, Rfl: 4  •  Magnesium 250 MG tablet, Take 250 mg by mouth Daily., Disp: , Rfl:   •  Multiple Vitamins-Minerals (ICAPS AREDS 2 PO), Take  by mouth 2 (two) times a day., Disp: , Rfl:   •  traZODone (DESYREL) 75 MG half tablet, Take 75 mg by mouth Every  "Night., Disp: , Rfl:       Assessment/Plan     1.  Hypothyroidism: She is well controlled with normal TSH.  She is currently on levothyroxine 25 mcg p.o. daily and TSH is normal.  We will continue levothyroxine.      2.  Thyroid nodules: Left-sided thyroid nodule had increased in size and it underwent biopsy and pathology was benign.  Will follow thyroid ultrasound.    3. Osteoporosis: She is a scheduled for BMD in September 2019, she is currently taking calcium with vitamin D supplementation.  Her bone mineral density done in September 2019 showed a T score of -2.5 at femoral neck and a T score of -1.2 at the AP spine.  She is high risk for fracture, after extensive discussion with regards to treatment options  including oral bisphosphonates, IV bisphosphonates, IV subcutaneous Prolia, she decided to do oral bisphosphonate and was prescribed alendronate 70 mg once a week.  She took it from October 2019 to March 2020 but she developed some GI side effects and she stopped it in March 2020.  She is still taking calcium and vitamin D supplementation and has not had any fractures since last seen.  Will follow bone density.      4.  Hypocalcemia: On calcium and vitamin D supplementation.  Serum calcium is normal at 8.7.            Guillermo Perdue MD FACE.      EMR Dragon / transcription disclaimer:     \"Dictated utilizing Dragon dictation\".                 "

## 2020-10-16 NOTE — PATIENT INSTRUCTIONS
Continue current medications  Arrange for bone mineral density and thyroid ultrasound in next few days  Follow-up in 1 year with labs.

## 2020-10-26 ENCOUNTER — HOSPITAL ENCOUNTER (OUTPATIENT)
Dept: ULTRASOUND IMAGING | Facility: HOSPITAL | Age: 75
Discharge: HOME | End: 2020-10-26
Admitting: INTERNAL MEDICINE

## 2020-10-26 DIAGNOSIS — M81.0 AGE-RELATED OSTEOPOROSIS WITHOUT CURRENT PATHOLOGICAL FRACTURE: ICD-10-CM

## 2020-10-26 DIAGNOSIS — E89.0 POSTSURGICAL HYPOTHYROIDISM: ICD-10-CM

## 2020-10-26 DIAGNOSIS — E04.2 MULTIPLE THYROID NODULES: ICD-10-CM

## 2020-10-26 PROCEDURE — 76536 US EXAM OF HEAD AND NECK: CPT

## 2021-10-08 ENCOUNTER — HOSPITAL ENCOUNTER (OUTPATIENT)
Dept: BONE DENSITY | Facility: HOSPITAL | Age: 76
Discharge: HOME | End: 2021-10-08
Admitting: INTERNAL MEDICINE

## 2021-10-08 DIAGNOSIS — E04.2 MULTIPLE THYROID NODULES: ICD-10-CM

## 2021-10-08 DIAGNOSIS — E89.0 POSTSURGICAL HYPOTHYROIDISM: ICD-10-CM

## 2021-10-08 DIAGNOSIS — M81.0 AGE-RELATED OSTEOPOROSIS WITHOUT CURRENT PATHOLOGICAL FRACTURE: ICD-10-CM

## 2021-10-08 PROCEDURE — 77080 DXA BONE DENSITY AXIAL: CPT

## 2021-10-12 ENCOUNTER — OFFICE VISIT (OUTPATIENT)
Dept: ENDOCRINOLOGY | Facility: CLINIC | Age: 76
End: 2021-10-12

## 2021-10-12 VITALS
TEMPERATURE: 97.5 F | DIASTOLIC BLOOD PRESSURE: 70 MMHG | OXYGEN SATURATION: 100 % | WEIGHT: 122 LBS | SYSTOLIC BLOOD PRESSURE: 135 MMHG | BODY MASS INDEX: 21.62 KG/M2 | HEART RATE: 60 BPM | HEIGHT: 63 IN

## 2021-10-12 DIAGNOSIS — M81.0 AGE-RELATED OSTEOPOROSIS WITHOUT CURRENT PATHOLOGICAL FRACTURE: ICD-10-CM

## 2021-10-12 DIAGNOSIS — E89.0 POSTSURGICAL HYPOTHYROIDISM: Primary | ICD-10-CM

## 2021-10-12 DIAGNOSIS — E04.2 MULTIPLE THYROID NODULES: ICD-10-CM

## 2021-10-12 DIAGNOSIS — E83.51 HYPOCALCEMIA: ICD-10-CM

## 2021-10-12 PROCEDURE — 99214 OFFICE O/P EST MOD 30 MIN: CPT | Performed by: INTERNAL MEDICINE

## 2021-10-12 RX ORDER — LEVOTHYROXINE SODIUM 0.03 MG/1
25 TABLET ORAL DAILY
Qty: 90 TABLET | Refills: 4 | Status: SHIPPED | OUTPATIENT
Start: 2021-10-12 | End: 2022-10-10 | Stop reason: SDUPTHER

## 2021-10-12 NOTE — PROGRESS NOTES
Endocrine Progress Note Outpatient     Patient Care Team:  Isacc Nichols MD as PCP - General    Chief Complaint: Follow-up thyroid    HPI: 76-year-old female who was last seen here in September 2017.  She initially was seen here for hyperthyroidism due to a toxic nodule on the right side of thyroid.  She underwent right thyroidectomy on October 28, 2014 at [facility] by Dr. Kwan.  Pathology was benign.  She developed hypothyroidism back in August 2019 with a mild high TSH, she was started on levothyroxine 25 mcg p.o. daily which she is taking now.  Denies any hypothyroid complaints at this time.    Osteoporosis: Taking calcium with vitamin D supplementation.  She did try alendronate however she could not tolerate so she stopped it.  No fractures since last seen.    Multiple thyroid nodules: Ultrasound from August 2019 showed increase in the left side of thyroid nodule at 1.5 cm which she subsequently underwent biopsy and the pathology was benign.  No family history of thyroid cancer and no history of radiation exposure.  She denies dysphagia or choking but she has some change in the voice or hoarseness.    Hypocalcemia: On calcium supplementation.    Past Medical History:   Diagnosis Date   • Arthritis    • Cellulitis 02/01/2020   • Goiter    • Hyperthyroidism    • Osteopenia    • Osteoporosis        Social History     Socioeconomic History   • Marital status:    Tobacco Use   • Smoking status: Never Smoker   • Smokeless tobacco: Never Used   Vaping Use   • Vaping Use: Never used   Substance and Sexual Activity   • Alcohol use: No   • Drug use: No   • Sexual activity: Defer       Family History   Problem Relation Age of Onset   • Cancer Sister         breast       No Known Allergies    ROS:   Constitutional:  Denies fatigue, tiredness.    Eyes:  Denies change in visual acuity   HENT:  Denies nasal congestion or sore throat   Respiratory: denies cough, shortness of breath.   Cardiovascular:   denies chest pain, edema   GI:  Denies abdominal pain, nausea, vomiting.   Musculoskeletal:  Denies back pain or joint pain   Integument:  Denies dry skin and rash   Neurologic:  Denies headache, focal weakness or sensory changes   Endocrine:  Denies polyuria or polydipsia   Psychiatric:  Denies depression or anxiety      Vitals:    10/12/21 1108   BP: 135/70   Pulse: 60   Temp: 97.5 °F (36.4 °C)   SpO2: 100%       Physical Exam:  GEN: NAD, conversant  EYES: EOMI, PERRL, no conjunctival erythema  NECK: Left side of thyroid is palpable, full ROM   CV: RRR, no murmurs/rubs/gallops, no peripheral edema  LUNG: CTAB, no wheezes/rales/ronchin  SKIN: no rashes, no acanthosis  MSK: no deformities, full ROM of all extremities  NEURO: no tremors, DTR normal  PSYCH: AOX3, appropriate mood, affect normal      SCANNED - LABS (10/05/2021)   DEXA Bone Density Axial (10/08/2021 12:43)       Medication Review: Reviewed.       Current Outpatient Medications:   •  Calcium Carbonate-Vitamin D (CALCIUM 600+D) 600-200 MG-UNIT tablet, once daily, Disp: , Rfl:   •  estradiol (ESTRACE) 0.5 MG tablet, once daily, Disp: , Rfl:   •  ibuprofen (ADVIL,MOTRIN) 600 MG tablet, Take 600 mg by mouth., Disp: , Rfl:   •  levothyroxine (SYNTHROID, LEVOTHROID) 25 MCG tablet, Take 1 tablet by mouth Daily., Disp: 90 tablet, Rfl: 4  •  Magnesium 250 MG tablet, Take 250 mg by mouth Daily., Disp: , Rfl:   •  Multiple Vitamins-Minerals (ICAPS AREDS 2 PO), Take  by mouth 2 (two) times a day., Disp: , Rfl:   •  traZODone (DESYREL) 75 MG half tablet, Take 100 mg by mouth Every Night., Disp: , Rfl:       Assessment/Plan     1.  Hypothyroidism: She is well controlled with normal TSH.  She is currently on levothyroxine 25 mcg p.o. daily and TSH is normal.  We will continue levothyroxine.      2.  Thyroid nodules: Left-sided thyroid nodule had increased in size and it underwent biopsy and pathology was benign.  Will follow thyroid ultrasound.    3. Osteoporosis:  "Bone density improving, she now has osteopenia.  She could not tolerate Fosamax oral and is not interested in trying any other medications at this time.  She will continue calcium and vitamin D supplementation.  She is at high risk for fracture and she verbalized understanding..      4.  Hypocalcemia: On calcium and vitamin D supplementation.  Serum calcium is normal at 9.0            Guillermo Perdue MD FACE.      EMR Dragon / transcription disclaimer:     \"Dictated utilizing Dragon dictation\".                 "

## 2022-10-10 ENCOUNTER — OFFICE VISIT (OUTPATIENT)
Dept: ENDOCRINOLOGY | Facility: CLINIC | Age: 77
End: 2022-10-10

## 2022-10-10 VITALS
DIASTOLIC BLOOD PRESSURE: 68 MMHG | SYSTOLIC BLOOD PRESSURE: 125 MMHG | WEIGHT: 126 LBS | HEIGHT: 63 IN | BODY MASS INDEX: 22.32 KG/M2

## 2022-10-10 DIAGNOSIS — M81.0 AGE-RELATED OSTEOPOROSIS WITHOUT CURRENT PATHOLOGICAL FRACTURE: ICD-10-CM

## 2022-10-10 DIAGNOSIS — E89.0 POSTSURGICAL HYPOTHYROIDISM: Primary | ICD-10-CM

## 2022-10-10 DIAGNOSIS — E04.2 MULTIPLE THYROID NODULES: ICD-10-CM

## 2022-10-10 DIAGNOSIS — E83.51 HYPOCALCEMIA: ICD-10-CM

## 2022-10-10 PROCEDURE — 99214 OFFICE O/P EST MOD 30 MIN: CPT | Performed by: INTERNAL MEDICINE

## 2022-10-10 RX ORDER — LEVOTHYROXINE SODIUM 0.03 MG/1
25 TABLET ORAL DAILY
Qty: 90 TABLET | Refills: 4 | Status: SHIPPED | OUTPATIENT
Start: 2022-10-10

## 2022-10-10 NOTE — PROGRESS NOTES
Endocrine Progress Note Outpatient     Patient Care Team:  Isacc Nichols MD as PCP - General    Chief Complaint: Follow-up thyroid    HPI: 76-year-old female who was last seen here in September 2017.  She initially was seen here for hyperthyroidism due to a toxic nodule on the right side of thyroid.  She underwent right thyroidectomy on October 28, 2014 at [facility] by Dr. Kwan.  Pathology was benign.  She developed hypothyroidism back in August 2019 with a mild high TSH, she was started on levothyroxine 25 mcg p.o. daily which she is taking now.  Denies any hypothyroid complaints at this time.    Osteoporosis: Taking calcium with vitamin D supplementation.  She did try alendronate however she could not tolerate so she stopped it.  No fractures since last seen.    Multiple thyroid nodules: Ultrasound from August 2019 showed increase in the left side of thyroid nodule at 1.5 cm which she subsequently underwent biopsy and the pathology was benign.  Repeat ultrasound thyroid in October 2020 showed a stable left thyroid nodule.  No family history of thyroid cancer and no history of radiation exposure.  She denies dysphagia or choking but she has some change in the voice or hoarseness.    Hypocalcemia: On calcium supplementation.    Past Medical History:   Diagnosis Date   • Arthritis    • Cellulitis 02/01/2020   • Goiter    • Hyperthyroidism    • Osteopenia    • Osteoporosis        Social History     Socioeconomic History   • Marital status:      Spouse name: Michael   • Number of children: 2   • Years of education: 12   Tobacco Use   • Smoking status: Never   • Smokeless tobacco: Never   Vaping Use   • Vaping Use: Never used   Substance and Sexual Activity   • Alcohol use: No   • Drug use: No   • Sexual activity: Defer       Family History   Problem Relation Age of Onset   • Cancer Sister         breast       No Known Allergies    ROS:   Constitutional:  Denies fatigue, tiredness.    Eyes:   Denies change in visual acuity   HENT:  Denies nasal congestion or sore throat   Respiratory: denies cough, shortness of breath.   Cardiovascular:  denies chest pain, edema   GI:  Denies abdominal pain, nausea, vomiting.   Musculoskeletal:  Denies back pain or joint pain   Integument:  Denies dry skin and rash   Neurologic:  Denies headache, focal weakness or sensory changes   Endocrine:  Denies polyuria or polydipsia   Psychiatric:  Denies depression or anxiety      Vitals:    10/10/22 1051   BP: 125/68       Physical Exam:  GEN: NAD, conversant  EYES: EOMI, PERRL, no conjunctival erythema  NECK: Left side of thyroid is palpable, full ROM   CV: RRR, no murmurs/rubs/gallops, no peripheral edema  LUNG: CTAB, no wheezes/rales/ronchin  SKIN: no rashes, no acanthosis  MSK: no deformities, full ROM of all extremities  NEURO: no tremors, DTR normal  PSYCH: AOX3, appropriate mood, affect normal    Labs from October 3, 2022 showed a TSH of 4.06, free T4 1.03  CMP showed sodium 140, potassium 4.0, chloride 105, CO2 28, glucose 83, BUN 13, creatinine 1.8, AST 19 and ALT 14.  Calcium was 8.6.    SCANNED - LABS (10/05/2021)   DEXA Bone Density Axial (10/08/2021 12:43)   US Thyroid (10/26/2020 11:46)       Medication Review: Reviewed.       Current Outpatient Medications:   •  Calcium Carbonate-Vitamin D 600-200 MG-UNIT tablet, once daily, Disp: , Rfl:   •  estradiol (ESTRACE) 0.5 MG tablet, once daily, Disp: , Rfl:   •  ibuprofen (ADVIL,MOTRIN) 600 MG tablet, Take 600 mg by mouth., Disp: , Rfl:   •  levothyroxine (SYNTHROID, LEVOTHROID) 25 MCG tablet, Take 1 tablet by mouth Daily., Disp: 90 tablet, Rfl: 4  •  Magnesium 250 MG tablet, Take 250 mg by mouth Daily., Disp: , Rfl:   •  Multiple Vitamins-Minerals (ICAPS AREDS 2 PO), Take  by mouth 2 (two) times a day., Disp: , Rfl:   •  traZODone (DESYREL) 75 MG half tablet, Take 2 half tablet by mouth Every Night., Disp: , Rfl:       Assessment & Plan     1.  Hypothyroidism: TSH mild  "high but she is clinically doing well without any hypothyroid symptoms.  We will continue levothyroxine 25 mcg p.o. daily and follow labs.    2.  Thyroid nodules: Left-sided thyroid nodule had increased in size and it underwent biopsy and pathology was benign.  Repeat ultrasound in October 2020 was stable.    3. Osteoporosis: Bone density improving, she now has osteopenia.  She could not tolerate Fosamax oral and is not interested in trying any other medications at this time.  She will continue calcium and vitamin D supplementation.  She is at high risk for fracture and she verbalized understanding..    4.  Hypocalcemia: On calcium and vitamin D supplementation.  Calcium normal.    Assessment and plan from October 12, 2021 reviewed and updated.            Guillermo Perdue MD FACE.      EMR Dragon / transcription disclaimer:     \"Dictated utilizing Dragon dictation\".                 "

## 2023-09-22 ENCOUNTER — TELEPHONE (OUTPATIENT)
Dept: ENDOCRINOLOGY | Facility: CLINIC | Age: 78
End: 2023-09-22

## 2023-09-22 NOTE — TELEPHONE ENCOUNTER
Caller: Elijah Westbrook    Relationship to patient: Self    Best call back number: [phone] (home)       Patient is needing: PT CAN NOT SEE DR UNTIL BONE SCAN IS DONE AND B/C OF INSURANCE THAT CAN'T BE DONE UNTIL THE 9TH. PT NEEDS TO RESCHEDULE APPT UNTIL AFTER SCAN. CAN PT BE WORKED IN BEFORE APRIL? PLEASE CALL PT TO ADVISE

## 2023-10-09 ENCOUNTER — HOSPITAL ENCOUNTER (OUTPATIENT)
Dept: BONE DENSITY | Facility: HOSPITAL | Age: 78
Discharge: HOME | End: 2023-10-09
Admitting: INTERNAL MEDICINE
Payer: MEDICARE

## 2023-10-09 ENCOUNTER — OFFICE VISIT (OUTPATIENT)
Dept: ENDOCRINOLOGY | Facility: CLINIC | Age: 78
End: 2023-10-09
Payer: MEDICARE

## 2023-10-09 VITALS
DIASTOLIC BLOOD PRESSURE: 68 MMHG | BODY MASS INDEX: 22.32 KG/M2 | SYSTOLIC BLOOD PRESSURE: 112 MMHG | WEIGHT: 126 LBS | HEIGHT: 63 IN

## 2023-10-09 DIAGNOSIS — M81.0 AGE-RELATED OSTEOPOROSIS WITHOUT CURRENT PATHOLOGICAL FRACTURE: ICD-10-CM

## 2023-10-09 DIAGNOSIS — E04.2 MULTIPLE THYROID NODULES: ICD-10-CM

## 2023-10-09 DIAGNOSIS — E89.0 POSTSURGICAL HYPOTHYROIDISM: Primary | ICD-10-CM

## 2023-10-09 DIAGNOSIS — E89.0 POSTSURGICAL HYPOTHYROIDISM: ICD-10-CM

## 2023-10-09 DIAGNOSIS — E83.51 HYPOCALCEMIA: ICD-10-CM

## 2023-10-09 PROCEDURE — 1159F MED LIST DOCD IN RCRD: CPT | Performed by: INTERNAL MEDICINE

## 2023-10-09 PROCEDURE — 77080 DXA BONE DENSITY AXIAL: CPT

## 2023-10-09 PROCEDURE — 1160F RVW MEDS BY RX/DR IN RCRD: CPT | Performed by: INTERNAL MEDICINE

## 2023-10-09 PROCEDURE — 99214 OFFICE O/P EST MOD 30 MIN: CPT | Performed by: INTERNAL MEDICINE

## 2023-10-09 RX ORDER — LEVOTHYROXINE SODIUM 0.03 MG/1
25 TABLET ORAL DAILY
Qty: 90 TABLET | Refills: 4 | Status: SHIPPED | OUTPATIENT
Start: 2023-10-09

## 2023-10-09 NOTE — PROGRESS NOTES
Endocrine Progress Note Outpatient     Patient Care Team:  Isacc Nichols MD as PCP - General    Chief Complaint: Follow-up thyroid    HPI: 78-year-old female who was last seen here in September 2017.  She initially was seen here for hyperthyroidism due to a toxic nodule on the right side of thyroid.  She underwent right thyroidectomy on October 28, 2014 at [facility] by Dr. Kwan.  Pathology was benign.  She developed hypothyroidism back in August 2019 with a mild high TSH, she was started on levothyroxine 25 mcg p.o. daily which she is taking now.  Denies any hypothyroid complaints at this time.    Osteoporosis: Taking calcium with vitamin D supplementation.  She did try alendronate however she could not tolerate so she stopped it.  No fractures since last seen.    Multiple thyroid nodules: Ultrasound from August 2019 showed increase in the left side of thyroid nodule at 1.5 cm which she subsequently underwent biopsy and the pathology was benign.  Repeat ultrasound thyroid in October 2020 showed a stable left thyroid nodule.  No family history of thyroid cancer and no history of radiation exposure.  She denies dysphagia or choking but she has some change in the voice or hoarseness.    Hypocalcemia: On calcium supplementation.    Past Medical History:   Diagnosis Date    Arthritis     Cellulitis 02/01/2020    Goiter     Hyperthyroidism     Osteopenia     Osteoporosis        Social History     Socioeconomic History    Marital status:      Spouse name: Michael    Number of children: 2    Years of education: 12   Tobacco Use    Smoking status: Never    Smokeless tobacco: Never   Vaping Use    Vaping Use: Never used   Substance and Sexual Activity    Alcohol use: No    Drug use: No    Sexual activity: Defer       Family History   Problem Relation Age of Onset    Cancer Sister         breast       No Known Allergies    ROS:   Constitutional:  Denies fatigue, tiredness.    Eyes:  Denies change in  visual acuity   HENT:  Denies nasal congestion or sore throat   Respiratory: denies cough, shortness of breath.   Cardiovascular:  denies chest pain, edema   GI:  Denies abdominal pain, nausea, vomiting.   Musculoskeletal:  Denies back pain or joint pain   Integument:  Denies dry skin and rash   Neurologic:  Denies headache, focal weakness or sensory changes   Endocrine:  Denies polyuria or polydipsia   Psychiatric:  Denies depression or anxiety      Vitals:    10/09/23 1047   BP: 112/68       Physical Exam:  GEN: NAD, conversant  EYES: EOMI,   NECK: Left side of thyroid is palpable,   CV: RRR,   LUNG: CTA  PSYCH: AOX3, appropriate mood, affect normal    Labs from October 2, 2020 that showed a TSH of 2.8 and free T41.04.    DEXA Bone Density Axial (10/09/2023 09:24)  SCANNED - LABS (10/05/2021)   DEXA Bone Density Axial (10/08/2021 12:43)   US Thyroid (10/26/2020 11:46)       Medication Review: Reviewed.       Current Outpatient Medications:     Calcium Carbonate-Vitamin D 600-200 MG-UNIT tablet, once daily, Disp: , Rfl:     estradiol (ESTRACE) 0.5 MG tablet, 0.25 every 4 days, Disp: , Rfl:     ibuprofen (ADVIL,MOTRIN) 600 MG tablet, Take 1 tablet by mouth., Disp: , Rfl:     levothyroxine (SYNTHROID, LEVOTHROID) 25 MCG tablet, Take 1 tablet by mouth Daily., Disp: 90 tablet, Rfl: 4    Magnesium 250 MG tablet, Take 1 tablet by mouth Daily., Disp: , Rfl:     Multiple Vitamins-Minerals (ICAPS AREDS 2 PO), Take  by mouth 2 (two) times a day., Disp: , Rfl:     traZODone (DESYREL) 75 MG half tablet, Take 1 half tablet by mouth Every Night. 100 mg daily every night, Disp: , Rfl:       Assessment & Plan     1.  Hypothyroidism: Well-controlled with normal TSH and free T4.  We will continue levothyroxine 25 mcg p.o. daily.    2.  Thyroid nodules: Left-sided thyroid nodule had increased in size and it underwent biopsy and pathology was benign.  Repeat ultrasound in October 2020 was stable.    3. Osteoporosis: Bone density  "showed osteopenia with high risk for fractures in the hip.  She is not interested in trying any more medications for osteoporosis at this time.  She will continue calcium and vitamin D supplementation.    4.  Hypocalcemia: On calcium and vitamin D supplementation.     Assessment and plan from October 10, 2022 reviewed and updated.            Guillermo Perdue MD FACE.      EMR Dragon / transcription disclaimer:     \"Dictated utilizing Dragon dictation\".                 "

## 2023-12-20 ENCOUNTER — HOSPITAL ENCOUNTER (OUTPATIENT)
Facility: HOSPITAL | Age: 78
Discharge: HOME | End: 2023-12-20
Attending: EMERGENCY MEDICINE | Admitting: EMERGENCY MEDICINE
Payer: MEDICARE

## 2023-12-20 VITALS
SYSTOLIC BLOOD PRESSURE: 117 MMHG | RESPIRATION RATE: 18 BRPM | HEIGHT: 65 IN | TEMPERATURE: 98.3 F | WEIGHT: 125 LBS | DIASTOLIC BLOOD PRESSURE: 63 MMHG | HEART RATE: 90 BPM | OXYGEN SATURATION: 98 % | BODY MASS INDEX: 20.83 KG/M2

## 2023-12-20 DIAGNOSIS — J02.9 ACUTE PHARYNGITIS, UNSPECIFIED ETIOLOGY: Primary | ICD-10-CM

## 2023-12-20 LAB
FLUAV SUBTYP SPEC NAA+PROBE: NOT DETECTED
FLUBV RNA ISLT QL NAA+PROBE: NOT DETECTED
RSV RNA NPH QL NAA+NON-PROBE: NOT DETECTED
SARS-COV-2 RNA RESP QL NAA+PROBE: NOT DETECTED
STREP A PCR: NOT DETECTED

## 2023-12-20 PROCEDURE — 87636 SARSCOV2 & INF A&B AMP PRB: CPT | Performed by: EMERGENCY MEDICINE

## 2023-12-20 PROCEDURE — G0463 HOSPITAL OUTPT CLINIC VISIT: HCPCS | Performed by: EMERGENCY MEDICINE

## 2023-12-20 PROCEDURE — 87651 STREP A DNA AMP PROBE: CPT | Performed by: EMERGENCY MEDICINE

## 2023-12-20 PROCEDURE — 87634 RSV DNA/RNA AMP PROBE: CPT | Performed by: EMERGENCY MEDICINE

## 2023-12-20 RX ORDER — FLUTICASONE PROPIONATE 50 MCG
2 SPRAY, SUSPENSION (ML) NASAL DAILY
Qty: 11.1 ML | Refills: 0 | Status: SHIPPED | OUTPATIENT
Start: 2023-12-20 | End: 2023-12-27

## 2023-12-20 NOTE — ED NOTES
Pt states that she traveled to [place] on November 26th and got home December 6th.  States that she wasn't feeling well and got tested and was positive for the flu.  Pt states that she was given some medication, started to feeling better until yesterday when she started to have a sore throat, cough, congestion, body aches.

## 2023-12-20 NOTE — FSED PROVIDER NOTE
Subjective   History of Present Illness  Sore Throat  Patient complains of sore throat. Associated symptoms include coryza, yellow nasal discharge, post nasal drip, sinus and nasal congestion, and sore throat. Onset of symptoms was 2 days ago, and have been stable since that time. She is drinking plenty of fluids. She has not had recent close exposure to someone with proven streptococcal pharyngitis but does have grandchildren who have been ill.  Notably patient tested positive for influenza on 12 7 which she fully recovered from.          Review of Systems   All other systems reviewed and are negative.      Past Medical History:   Diagnosis Date    Arthritis     Cellulitis 02/01/2020    Goiter     Hyperthyroidism     Osteopenia     Osteoporosis        No Known Allergies    Past Surgical History:   Procedure Laterality Date    APPENDECTOMY      CERVICAL DISC SURGERY      HERNIA REPAIR      HYSTERECTOMY      LUNG SURGERY      SINUS SURGERY      THYROID SURGERY      US GUIDED FINE NEEDLE ASPIRATION  9/3/2019       Family History   Problem Relation Age of Onset    Cancer Sister         breast       Social History     Socioeconomic History    Marital status:      Spouse name: Michael    Number of children: 2    Years of education: 12   Tobacco Use    Smoking status: Never    Smokeless tobacco: Never   Vaping Use    Vaping Use: Never used   Substance and Sexual Activity    Alcohol use: No    Drug use: No    Sexual activity: Defer           Objective   Physical Exam  Constitutional:       Appearance: Normal appearance.   HENT:      Nose: Congestion present.      Mouth/Throat:      Mouth: Mucous membranes are moist.      Pharynx: Posterior oropharyngeal erythema present. No oropharyngeal exudate.   Eyes:      Conjunctiva/sclera: Conjunctivae normal.   Cardiovascular:      Rate and Rhythm: Normal rate and regular rhythm.   Pulmonary:      Effort: Pulmonary effort is normal.      Breath sounds: Normal breath sounds.    Abdominal:      Palpations: Abdomen is soft.      Tenderness: There is no abdominal tenderness.   Musculoskeletal:         General: No swelling or deformity.   Skin:     Findings: No rash.   Neurological:      General: No focal deficit present.      Mental Status: She is alert and oriented to person, place, and time.         Procedures           ED Course  ED Course as of 12/20/23 0829   Wed Dec 20, 2023   0816 RSV PCR - Swab, Nasopharynx  Results reviewed, discussed with patient. [JM]      ED Course User Index  [JM] Morgan Cosby MD                                           Medical Decision Making  Testing reviewed negative strep negative flu negative COVID.  Symptoms likely viral in etiology. No indication for abx at this time after only 1 d of symptoms.   Over-the-counter medication recommendations provided.      Problems Addressed:  Acute pharyngitis, unspecified etiology: complicated acute illness or injury    Amount and/or Complexity of Data Reviewed  Labs: ordered.    Risk  OTC drugs.        Final diagnoses:   Acute pharyngitis, unspecified etiology       ED Disposition  ED Disposition       ED Disposition   Discharge    Condition   Stable    Comment   --               Isacc Nichols MD  [address] [place] [address] [place] [id number] [phone]    In 1 week  If symptoms worsen         Medication List        New Prescriptions      fluticasone 50 MCG/ACT nasal spray  Commonly known as: FLONASE  2 sprays into the nostril(s) as directed by provider Daily for 7 days.               Where to Get Your Medications        These medications were sent to [facility] [id number] - [place], [place] - [address] [place] [address] [address]  - [phone]  - [phone]   [address] [place] [address]      Phone: [phone]   fluticasone 50 MCG/ACT nasal spray

## 2023-12-20 NOTE — ED NOTES
Went to patient room to give discharge instructions but patient not in room or in facility. Paperwork left at .

## 2024-09-18 ENCOUNTER — OFFICE (AMBULATORY)
Age: 79
End: 2024-09-18
Payer: COMMERCIAL

## 2024-09-18 ENCOUNTER — OFFICE (AMBULATORY)
Dept: URBAN - METROPOLITAN AREA CLINIC 64 | Facility: CLINIC | Age: 79
End: 2024-09-18
Payer: COMMERCIAL

## 2024-09-18 VITALS
HEART RATE: 66 BPM | HEIGHT: 65 IN | WEIGHT: 126 LBS | SYSTOLIC BLOOD PRESSURE: 132 MMHG | HEART RATE: 66 BPM | HEART RATE: 66 BPM | HEIGHT: 65 IN | WEIGHT: 126 LBS | SYSTOLIC BLOOD PRESSURE: 132 MMHG | SYSTOLIC BLOOD PRESSURE: 132 MMHG | DIASTOLIC BLOOD PRESSURE: 85 MMHG | HEIGHT: 65 IN | DIASTOLIC BLOOD PRESSURE: 85 MMHG | HEIGHT: 65 IN | HEART RATE: 66 BPM | WEIGHT: 126 LBS | HEART RATE: 66 BPM | HEART RATE: 66 BPM | WEIGHT: 126 LBS | DIASTOLIC BLOOD PRESSURE: 85 MMHG | SYSTOLIC BLOOD PRESSURE: 132 MMHG | DIASTOLIC BLOOD PRESSURE: 85 MMHG | DIASTOLIC BLOOD PRESSURE: 85 MMHG | SYSTOLIC BLOOD PRESSURE: 132 MMHG | HEIGHT: 65 IN | WEIGHT: 126 LBS | HEIGHT: 65 IN | DIASTOLIC BLOOD PRESSURE: 85 MMHG | HEIGHT: 65 IN | SYSTOLIC BLOOD PRESSURE: 132 MMHG | WEIGHT: 126 LBS | DIASTOLIC BLOOD PRESSURE: 85 MMHG | SYSTOLIC BLOOD PRESSURE: 132 MMHG | WEIGHT: 126 LBS | HEART RATE: 66 BPM

## 2024-09-18 DIAGNOSIS — Z87.19 PERSONAL HISTORY OF OTHER DISEASES OF THE DIGESTIVE SYSTEM: ICD-10-CM

## 2024-09-18 DIAGNOSIS — Z83.79 FAMILY HISTORY OF OTHER DISEASES OF THE DIGESTIVE SYSTEM: ICD-10-CM

## 2024-09-18 DIAGNOSIS — R19.7 DIARRHEA, UNSPECIFIED: ICD-10-CM

## 2024-09-18 PROCEDURE — 99203 OFFICE O/P NEW LOW 30 MIN: CPT

## 2024-10-09 ENCOUNTER — LAB (OUTPATIENT)
Dept: LAB | Facility: HOSPITAL | Age: 79
End: 2024-10-09
Payer: MEDICARE

## 2024-10-09 DIAGNOSIS — E04.2 MULTIPLE THYROID NODULES: ICD-10-CM

## 2024-10-09 DIAGNOSIS — M81.0 AGE-RELATED OSTEOPOROSIS WITHOUT CURRENT PATHOLOGICAL FRACTURE: ICD-10-CM

## 2024-10-09 DIAGNOSIS — E89.0 POSTSURGICAL HYPOTHYROIDISM: ICD-10-CM

## 2024-10-09 DIAGNOSIS — E83.51 HYPOCALCEMIA: ICD-10-CM

## 2024-10-09 LAB
ALBUMIN SERPL-MCNC: 4.3 G/DL (ref 3.5–5.2)
ALBUMIN/GLOB SERPL: 1.9 G/DL
ALP SERPL-CCNC: 82 U/L (ref 39–117)
ALT SERPL W P-5'-P-CCNC: 9 U/L (ref 1–33)
ANION GAP SERPL CALCULATED.3IONS-SCNC: 9.4 MMOL/L (ref 5–15)
AST SERPL-CCNC: 25 U/L (ref 1–32)
BILIRUB SERPL-MCNC: 0.4 MG/DL (ref 0–1.2)
BUN SERPL-MCNC: 14 MG/DL (ref 8–23)
BUN/CREAT SERPL: 16.5 (ref 7–25)
CALCIUM SPEC-SCNC: 9.5 MG/DL (ref 8.6–10.5)
CHLORIDE SERPL-SCNC: 102 MMOL/L (ref 98–107)
CO2 SERPL-SCNC: 27.6 MMOL/L (ref 22–29)
CREAT SERPL-MCNC: 0.85 MG/DL (ref 0.57–1)
EGFRCR SERPLBLD CKD-EPI 2021: 70.7 ML/MIN/1.73
GLOBULIN UR ELPH-MCNC: 2.3 GM/DL
GLUCOSE SERPL-MCNC: 85 MG/DL (ref 65–99)
POTASSIUM SERPL-SCNC: 4.2 MMOL/L (ref 3.5–5.2)
PROT SERPL-MCNC: 6.6 G/DL (ref 6–8.5)
SODIUM SERPL-SCNC: 139 MMOL/L (ref 136–145)
T4 FREE SERPL-MCNC: 1.34 NG/DL (ref 0.93–1.7)
TSH SERPL DL<=0.05 MIU/L-ACNC: 2.69 UIU/ML (ref 0.27–4.2)

## 2024-10-09 PROCEDURE — 36415 COLL VENOUS BLD VENIPUNCTURE: CPT

## 2024-10-09 PROCEDURE — 84443 ASSAY THYROID STIM HORMONE: CPT

## 2024-10-09 PROCEDURE — 80053 COMPREHEN METABOLIC PANEL: CPT

## 2024-10-09 PROCEDURE — 84439 ASSAY OF FREE THYROXINE: CPT

## 2024-10-10 ENCOUNTER — OFFICE VISIT (OUTPATIENT)
Dept: ENDOCRINOLOGY | Facility: CLINIC | Age: 79
End: 2024-10-10
Payer: MEDICARE

## 2024-10-10 VITALS
OXYGEN SATURATION: 99 % | SYSTOLIC BLOOD PRESSURE: 100 MMHG | HEIGHT: 65 IN | HEART RATE: 63 BPM | WEIGHT: 126 LBS | BODY MASS INDEX: 20.99 KG/M2 | DIASTOLIC BLOOD PRESSURE: 68 MMHG

## 2024-10-10 DIAGNOSIS — E89.0 POSTSURGICAL HYPOTHYROIDISM: Primary | ICD-10-CM

## 2024-10-10 DIAGNOSIS — E04.2 MULTIPLE THYROID NODULES: ICD-10-CM

## 2024-10-10 DIAGNOSIS — M81.0 AGE-RELATED OSTEOPOROSIS WITHOUT CURRENT PATHOLOGICAL FRACTURE: ICD-10-CM

## 2024-10-10 DIAGNOSIS — E83.51 HYPOCALCEMIA: ICD-10-CM

## 2024-10-10 RX ORDER — BIOTIN 1 MG
TABLET ORAL
COMMUNITY

## 2024-10-10 RX ORDER — LEVOTHYROXINE SODIUM 25 UG/1
25 TABLET ORAL DAILY
Qty: 90 TABLET | Refills: 4 | Status: SHIPPED | OUTPATIENT
Start: 2024-10-10

## 2024-10-10 RX ORDER — TACROLIMUS 1 MG/G
OINTMENT TOPICAL
COMMUNITY
Start: 2024-07-24

## 2024-10-10 RX ORDER — ESTRADIOL 0.5 MG/1
TABLET ORAL
COMMUNITY

## 2024-10-10 NOTE — PATIENT INSTRUCTIONS
Continue current management  Continue to work on diet and activity  Follow-up in 1 year with labs and bone density.

## 2024-10-10 NOTE — PROGRESS NOTES
Endocrine Progress Note Outpatient     Patient Care Team:  Isacc Nichols MD as PCP - General    Chief Complaint: Follow-up thyroid    HPI: 79-year-old female who was last seen here in September 2017.  She initially was seen here for hyperthyroidism due to a toxic nodule on the right side of thyroid.  She underwent right thyroidectomy on October 28, 2014 at [facility] by Dr. Kwan.  Pathology was benign.  She developed hypothyroidism back in August 2019 with a mild high TSH, she was started on levothyroxine 25 mcg p.o. daily which she is taking now.  Denies any hypothyroid complaints at this time.    Osteoporosis: Taking calcium with vitamin D supplementation.  She did try alendronate however she could not tolerate so she stopped it.  No fractures since last seen.  She is not interested in taking any more medications for osteoporosis.  She has not had any fractures since last time.    Multiple thyroid nodules: Ultrasound from August 2019 showed increase in the left side of thyroid nodule at 1.5 cm which she subsequently underwent biopsy and the pathology was benign.  Repeat ultrasound thyroid in October 2020 showed a stable left thyroid nodule.  No family history of thyroid cancer and no history of radiation exposure.  She denies dysphagia or choking but she has some change in the voice or hoarseness.    Hypocalcemia: On calcium supplementation.    Past Medical History:   Diagnosis Date    Arthritis     Bronchitis     Cellulitis 02/01/2020    Goiter     Hyperthyroidism     Osteopenia     Osteoporosis     Pneumonia        Social History     Socioeconomic History    Marital status:      Spouse name: Michael    Number of children: 2    Years of education: 12   Tobacco Use    Smoking status: Never     Passive exposure: Never    Smokeless tobacco: Never   Vaping Use    Vaping status: Never Used   Substance and Sexual Activity    Alcohol use: No    Drug use: No    Sexual activity: Defer       Family  History   Problem Relation Age of Onset    Cancer Sister         breast       No Known Allergies    ROS:   Constitutional:  Denies fatigue, tiredness.    Eyes:  Denies change in visual acuity   HENT:  Denies nasal congestion or sore throat   Respiratory: denies cough, shortness of breath.   Cardiovascular:  denies chest pain, edema   GI:  Denies abdominal pain, nausea, vomiting.   Musculoskeletal:  Denies back pain or joint pain   Integument:  Denies dry skin and rash   Neurologic:  Denies headache, focal weakness or sensory changes   Endocrine:  Denies polyuria or polydipsia   Psychiatric:  Denies depression or anxiety      Vitals:    10/10/24 1037   BP: 100/68   Pulse: 63   SpO2: 99%     BMI: 21  Physical Exam:  GEN: NAD, conversant  EYES: EOMI,   NECK: Left side of thyroid is palpable,   CV: RRR,   LUNG: CTA  PSYCH: AOX3, appropriate mood, affect normal    Labs from October 2, 2020 that showed a TSH of 2.8 and free T41.04.    DEXA Bone Density Axial (10/09/2023 09:24)  SCANNED - LABS (10/05/2021)   DEXA Bone Density Axial (10/08/2021 12:43)   US Thyroid (10/26/2020 11:46)       Medication Review: Reviewed.       Current Outpatient Medications:     Biotin 1000 MCG tablet, tablet, Disp: , Rfl:     Calcium Carbonate-Vitamin D 600-200 MG-UNIT tablet, once daily, Disp: , Rfl:     estradiol (ESTRACE) 0.5 MG tablet, tablet, Disp: , Rfl:     ibuprofen (ADVIL,MOTRIN) 600 MG tablet, Take 1 tablet by mouth., Disp: , Rfl:     levothyroxine (SYNTHROID, LEVOTHROID) 25 MCG tablet, Take 1 tablet by mouth Daily., Disp: 90 tablet, Rfl: 4    Multiple Vitamins-Minerals (ICAPS AREDS 2 PO), Take  by mouth 2 (two) times a day., Disp: , Rfl:     tacrolimus (PROTOPIC) 0.1 % ointment, Apply thin layer twice daily to affected rash area(s) around the eyes. Can be stored in the refrigerator if experiencing burning., Disp: , Rfl:     traZODone (DESYREL) 75 MG half tablet, Take 2 half tablet by mouth Every Night. 100 mg daily every night,  "Disp: , Rfl:     Magnesium 250 MG tablet, Take 1 tablet by mouth Daily. (Patient not taking: Reported on 10/10/2024), Disp: , Rfl:       Assessment & Plan     1.  Hypothyroidism: Well-controlled with normal TSH and free T4.  We will continue levothyroxine 25 mcg p.o. daily.    2.  Thyroid nodules: Left-sided thyroid nodule had increased in size and it underwent biopsy and pathology was benign.  Repeat ultrasound in October 2020 was stable.    3. Osteoporosis: Bone density showed osteopenia with high risk for fractures in the hip.  She is not interested in trying any more medications for osteoporosis at this time.  She will continue calcium and vitamin D supplementation.    4.  Hypocalcemia: On calcium and vitamin D supplementation.     Assessment and plan from October 9, 2023 reviewed and updated.            Guillermo Perdue MD FACE.      [employer] Dragon / transcription disclaimer:     \"Dictated utilizing Dragon dictation\".                 "

## 2024-10-23 ENCOUNTER — ON CAMPUS - OUTPATIENT (AMBULATORY)
Age: 79
End: 2024-10-23
Payer: MEDICARE

## 2024-10-23 ENCOUNTER — OFFICE (AMBULATORY)
Age: 79
End: 2024-10-23
Payer: COMMERCIAL

## 2024-10-23 ENCOUNTER — OFFICE (AMBULATORY)
Dept: URBAN - METROPOLITAN AREA PATHOLOGY 19 | Facility: PATHOLOGY | Age: 79
End: 2024-10-23
Payer: COMMERCIAL

## 2024-10-23 ENCOUNTER — ON CAMPUS - OUTPATIENT (AMBULATORY)
Dept: URBAN - METROPOLITAN AREA HOSPITAL 2 | Facility: HOSPITAL | Age: 79
End: 2024-10-23
Payer: MEDICARE

## 2024-10-23 VITALS
RESPIRATION RATE: 14 BRPM | HEART RATE: 65 BPM | SYSTOLIC BLOOD PRESSURE: 108 MMHG | OXYGEN SATURATION: 98 % | OXYGEN SATURATION: 99 % | DIASTOLIC BLOOD PRESSURE: 59 MMHG | SYSTOLIC BLOOD PRESSURE: 97 MMHG | HEIGHT: 65 IN | SYSTOLIC BLOOD PRESSURE: 148 MMHG | DIASTOLIC BLOOD PRESSURE: 52 MMHG | SYSTOLIC BLOOD PRESSURE: 96 MMHG | DIASTOLIC BLOOD PRESSURE: 61 MMHG | WEIGHT: 123 LBS | OXYGEN SATURATION: 100 % | SYSTOLIC BLOOD PRESSURE: 108 MMHG | DIASTOLIC BLOOD PRESSURE: 45 MMHG | HEART RATE: 68 BPM | HEART RATE: 72 BPM | DIASTOLIC BLOOD PRESSURE: 64 MMHG | DIASTOLIC BLOOD PRESSURE: 55 MMHG | SYSTOLIC BLOOD PRESSURE: 123 MMHG | HEART RATE: 72 BPM | HEART RATE: 68 BPM | DIASTOLIC BLOOD PRESSURE: 64 MMHG | OXYGEN SATURATION: 98 % | WEIGHT: 123 LBS | DIASTOLIC BLOOD PRESSURE: 45 MMHG | RESPIRATION RATE: 14 BRPM | SYSTOLIC BLOOD PRESSURE: 91 MMHG | DIASTOLIC BLOOD PRESSURE: 55 MMHG | OXYGEN SATURATION: 97 % | OXYGEN SATURATION: 97 % | RESPIRATION RATE: 16 BRPM | WEIGHT: 123 LBS | HEART RATE: 67 BPM | SYSTOLIC BLOOD PRESSURE: 96 MMHG | TEMPERATURE: 97.9 F | SYSTOLIC BLOOD PRESSURE: 97 MMHG | HEART RATE: 65 BPM | HEART RATE: 72 BPM | OXYGEN SATURATION: 97 % | RESPIRATION RATE: 14 BRPM | DIASTOLIC BLOOD PRESSURE: 64 MMHG | HEART RATE: 67 BPM | OXYGEN SATURATION: 98 % | OXYGEN SATURATION: 97 % | HEART RATE: 70 BPM | DIASTOLIC BLOOD PRESSURE: 52 MMHG | SYSTOLIC BLOOD PRESSURE: 148 MMHG | SYSTOLIC BLOOD PRESSURE: 108 MMHG | HEART RATE: 66 BPM | DIASTOLIC BLOOD PRESSURE: 52 MMHG | TEMPERATURE: 97.9 F | HEART RATE: 65 BPM | OXYGEN SATURATION: 100 % | SYSTOLIC BLOOD PRESSURE: 122 MMHG | OXYGEN SATURATION: 100 % | RESPIRATION RATE: 14 BRPM | SYSTOLIC BLOOD PRESSURE: 96 MMHG | SYSTOLIC BLOOD PRESSURE: 108 MMHG | SYSTOLIC BLOOD PRESSURE: 148 MMHG | OXYGEN SATURATION: 97 % | DIASTOLIC BLOOD PRESSURE: 45 MMHG | DIASTOLIC BLOOD PRESSURE: 52 MMHG | SYSTOLIC BLOOD PRESSURE: 148 MMHG | DIASTOLIC BLOOD PRESSURE: 55 MMHG | HEART RATE: 66 BPM | HEART RATE: 68 BPM | OXYGEN SATURATION: 98 % | SYSTOLIC BLOOD PRESSURE: 123 MMHG | DIASTOLIC BLOOD PRESSURE: 44 MMHG | DIASTOLIC BLOOD PRESSURE: 55 MMHG | RESPIRATION RATE: 14 BRPM | DIASTOLIC BLOOD PRESSURE: 44 MMHG | HEIGHT: 65 IN | HEART RATE: 65 BPM | OXYGEN SATURATION: 100 % | DIASTOLIC BLOOD PRESSURE: 55 MMHG | HEART RATE: 66 BPM | SYSTOLIC BLOOD PRESSURE: 123 MMHG | SYSTOLIC BLOOD PRESSURE: 122 MMHG | SYSTOLIC BLOOD PRESSURE: 108 MMHG | RESPIRATION RATE: 16 BRPM | RESPIRATION RATE: 14 BRPM | SYSTOLIC BLOOD PRESSURE: 96 MMHG | OXYGEN SATURATION: 100 % | SYSTOLIC BLOOD PRESSURE: 148 MMHG | HEART RATE: 65 BPM | WEIGHT: 123 LBS | DIASTOLIC BLOOD PRESSURE: 59 MMHG | HEART RATE: 65 BPM | OXYGEN SATURATION: 98 % | DIASTOLIC BLOOD PRESSURE: 64 MMHG | DIASTOLIC BLOOD PRESSURE: 61 MMHG | HEART RATE: 66 BPM | DIASTOLIC BLOOD PRESSURE: 61 MMHG | DIASTOLIC BLOOD PRESSURE: 52 MMHG | DIASTOLIC BLOOD PRESSURE: 61 MMHG | TEMPERATURE: 97.9 F | HEART RATE: 70 BPM | HEART RATE: 72 BPM | HEART RATE: 72 BPM | SYSTOLIC BLOOD PRESSURE: 123 MMHG | WEIGHT: 123 LBS | SYSTOLIC BLOOD PRESSURE: 91 MMHG | HEART RATE: 72 BPM | SYSTOLIC BLOOD PRESSURE: 91 MMHG | HEIGHT: 65 IN | HEART RATE: 70 BPM | DIASTOLIC BLOOD PRESSURE: 55 MMHG | OXYGEN SATURATION: 99 % | RESPIRATION RATE: 16 BRPM | HEART RATE: 67 BPM | HEIGHT: 65 IN | HEIGHT: 65 IN | HEART RATE: 68 BPM | DIASTOLIC BLOOD PRESSURE: 61 MMHG | SYSTOLIC BLOOD PRESSURE: 91 MMHG | TEMPERATURE: 97.9 F | OXYGEN SATURATION: 97 % | OXYGEN SATURATION: 99 % | HEART RATE: 70 BPM | HEART RATE: 67 BPM | OXYGEN SATURATION: 97 % | SYSTOLIC BLOOD PRESSURE: 97 MMHG | SYSTOLIC BLOOD PRESSURE: 96 MMHG | HEART RATE: 65 BPM | SYSTOLIC BLOOD PRESSURE: 123 MMHG | HEART RATE: 70 BPM | RESPIRATION RATE: 16 BRPM | SYSTOLIC BLOOD PRESSURE: 91 MMHG | DIASTOLIC BLOOD PRESSURE: 45 MMHG | DIASTOLIC BLOOD PRESSURE: 61 MMHG | SYSTOLIC BLOOD PRESSURE: 97 MMHG | HEIGHT: 65 IN | SYSTOLIC BLOOD PRESSURE: 108 MMHG | OXYGEN SATURATION: 99 % | WEIGHT: 123 LBS | OXYGEN SATURATION: 98 % | SYSTOLIC BLOOD PRESSURE: 108 MMHG | DIASTOLIC BLOOD PRESSURE: 44 MMHG | HEART RATE: 68 BPM | SYSTOLIC BLOOD PRESSURE: 148 MMHG | OXYGEN SATURATION: 98 % | DIASTOLIC BLOOD PRESSURE: 64 MMHG | HEIGHT: 65 IN | SYSTOLIC BLOOD PRESSURE: 122 MMHG | SYSTOLIC BLOOD PRESSURE: 91 MMHG | SYSTOLIC BLOOD PRESSURE: 148 MMHG | WEIGHT: 123 LBS | DIASTOLIC BLOOD PRESSURE: 45 MMHG | DIASTOLIC BLOOD PRESSURE: 45 MMHG | SYSTOLIC BLOOD PRESSURE: 123 MMHG | RESPIRATION RATE: 16 BRPM | DIASTOLIC BLOOD PRESSURE: 44 MMHG | SYSTOLIC BLOOD PRESSURE: 122 MMHG | DIASTOLIC BLOOD PRESSURE: 45 MMHG | OXYGEN SATURATION: 100 % | DIASTOLIC BLOOD PRESSURE: 44 MMHG | HEART RATE: 68 BPM | SYSTOLIC BLOOD PRESSURE: 97 MMHG | HEART RATE: 67 BPM | DIASTOLIC BLOOD PRESSURE: 59 MMHG | SYSTOLIC BLOOD PRESSURE: 91 MMHG | TEMPERATURE: 97.9 F | SYSTOLIC BLOOD PRESSURE: 122 MMHG | DIASTOLIC BLOOD PRESSURE: 44 MMHG | TEMPERATURE: 97.9 F | DIASTOLIC BLOOD PRESSURE: 59 MMHG | DIASTOLIC BLOOD PRESSURE: 52 MMHG | RESPIRATION RATE: 14 BRPM | TEMPERATURE: 97.9 F | OXYGEN SATURATION: 99 % | HEART RATE: 68 BPM | SYSTOLIC BLOOD PRESSURE: 122 MMHG | HEART RATE: 72 BPM | HEART RATE: 66 BPM | DIASTOLIC BLOOD PRESSURE: 59 MMHG | OXYGEN SATURATION: 99 % | HEART RATE: 70 BPM | DIASTOLIC BLOOD PRESSURE: 55 MMHG | OXYGEN SATURATION: 100 % | DIASTOLIC BLOOD PRESSURE: 64 MMHG | DIASTOLIC BLOOD PRESSURE: 61 MMHG | SYSTOLIC BLOOD PRESSURE: 122 MMHG | SYSTOLIC BLOOD PRESSURE: 97 MMHG | SYSTOLIC BLOOD PRESSURE: 123 MMHG | HEART RATE: 66 BPM | SYSTOLIC BLOOD PRESSURE: 97 MMHG | OXYGEN SATURATION: 99 % | DIASTOLIC BLOOD PRESSURE: 59 MMHG | RESPIRATION RATE: 16 BRPM | HEART RATE: 66 BPM | HEART RATE: 70 BPM | DIASTOLIC BLOOD PRESSURE: 52 MMHG | HEART RATE: 67 BPM | DIASTOLIC BLOOD PRESSURE: 44 MMHG | RESPIRATION RATE: 16 BRPM | DIASTOLIC BLOOD PRESSURE: 59 MMHG | SYSTOLIC BLOOD PRESSURE: 96 MMHG | HEART RATE: 67 BPM | DIASTOLIC BLOOD PRESSURE: 64 MMHG | SYSTOLIC BLOOD PRESSURE: 96 MMHG

## 2024-10-23 DIAGNOSIS — Z83.79 FAMILY HISTORY OF OTHER DISEASES OF THE DIGESTIVE SYSTEM: ICD-10-CM

## 2024-10-23 DIAGNOSIS — K57.30 DIVERTICULOSIS OF LARGE INTESTINE WITHOUT PERFORATION OR ABS: ICD-10-CM

## 2024-10-23 DIAGNOSIS — K52.832 LYMPHOCYTIC COLITIS: ICD-10-CM

## 2024-10-23 LAB
GI HISTOLOGY: A. WHOLE COLON: (no result)
GI HISTOLOGY: PDF REPORT: (no result)

## 2024-10-23 PROCEDURE — 45380 COLONOSCOPY AND BIOPSY: CPT | Performed by: INTERNAL MEDICINE

## 2024-10-23 PROCEDURE — 88305 TISSUE EXAM BY PATHOLOGIST: CPT | Mod: 26 | Performed by: PATHOLOGY

## 2024-12-20 ENCOUNTER — OFFICE (AMBULATORY)
Dept: URBAN - METROPOLITAN AREA CLINIC 64 | Facility: CLINIC | Age: 79
End: 2024-12-20
Payer: MEDICARE

## 2024-12-20 VITALS
HEIGHT: 65 IN | HEART RATE: 66 BPM | DIASTOLIC BLOOD PRESSURE: 57 MMHG | SYSTOLIC BLOOD PRESSURE: 111 MMHG | WEIGHT: 128 LBS

## 2024-12-20 DIAGNOSIS — K59.00 CONSTIPATION, UNSPECIFIED: ICD-10-CM

## 2024-12-20 DIAGNOSIS — K52.832 LYMPHOCYTIC COLITIS: ICD-10-CM

## 2024-12-20 DIAGNOSIS — R12 HEARTBURN: ICD-10-CM

## 2024-12-20 PROCEDURE — 99213 OFFICE O/P EST LOW 20 MIN: CPT

## 2025-06-19 ENCOUNTER — HOSPITAL ENCOUNTER (OUTPATIENT)
Facility: HOSPITAL | Age: 80
Discharge: HOME | End: 2025-06-19
Attending: EMERGENCY MEDICINE | Admitting: EMERGENCY MEDICINE
Payer: MEDICARE

## 2025-06-19 ENCOUNTER — APPOINTMENT (OUTPATIENT)
Dept: GENERAL RADIOLOGY | Facility: HOSPITAL | Age: 80
End: 2025-06-19
Payer: MEDICARE

## 2025-06-19 VITALS
OXYGEN SATURATION: 98 % | HEART RATE: 74 BPM | TEMPERATURE: 98.2 F | DIASTOLIC BLOOD PRESSURE: 57 MMHG | HEIGHT: 65 IN | SYSTOLIC BLOOD PRESSURE: 157 MMHG | RESPIRATION RATE: 18 BRPM | WEIGHT: 125.2 LBS | BODY MASS INDEX: 20.86 KG/M2

## 2025-06-19 DIAGNOSIS — M79.671 RIGHT FOOT PAIN: Primary | ICD-10-CM

## 2025-06-19 PROCEDURE — G0463 HOSPITAL OUTPT CLINIC VISIT: HCPCS

## 2025-06-19 PROCEDURE — 99212 OFFICE O/P EST SF 10 MIN: CPT

## 2025-06-19 PROCEDURE — 73630 X-RAY EXAM OF FOOT: CPT

## 2025-06-19 NOTE — DISCHARGE INSTRUCTIONS
Continue to ice and elevate your foot 20-minute increments several times a day.    Continue to treat with Tylenol for pain per  instructions.    Call and schedule follow-up appointment with podiatry if symptoms persist.    Return to the ER with any new or worsening symptoms.

## 2025-06-19 NOTE — FSED PROVIDER NOTE
Subjective   History of Present Illness  Patient is a 80-year-old female with history of bronchitis, arthritis, cellulitis, hypothyroidism, osteopenia, osteoporosis who presents today with right foot pain following dropping a bottle to the top of her foot.  He denies numbness, tingling or loss of sensation.        Review of Systems    Past Medical History:   Diagnosis Date    Arthritis     Bronchitis     Cellulitis 02/01/2020    Goiter     Hyperthyroidism     Osteopenia     Osteoporosis     Pneumonia        No Known Allergies    Past Surgical History:   Procedure Laterality Date    APPENDECTOMY      CERVICAL DISC SURGERY      HERNIA REPAIR      HYSTERECTOMY      LUNG SURGERY      SINUS SURGERY      THYROID SURGERY      US GUIDED FINE NEEDLE ASPIRATION  9/3/2019       Family History   Problem Relation Age of Onset    Cancer Sister         breast       Social History     Socioeconomic History    Marital status:      Spouse name: Michael    Number of children: 2    Years of education: 12   Tobacco Use    Smoking status: Never     Passive exposure: Never    Smokeless tobacco: Never   Vaping Use    Vaping status: Never Used   Substance and Sexual Activity    Alcohol use: No    Drug use: No    Sexual activity: Defer           Objective   Physical Exam  Vitals and nursing note reviewed.   Constitutional:       Appearance: Normal appearance.   HENT:      Head: Normocephalic.      Nose: Nose normal.      Mouth/Throat:      Mouth: Mucous membranes are moist.   Eyes:      Conjunctiva/sclera: Conjunctivae normal.   Cardiovascular:      Rate and Rhythm: Normal rate and regular rhythm.      Pulses: Normal pulses.      Heart sounds: Normal heart sounds.   Pulmonary:      Effort: Pulmonary effort is normal.      Breath sounds: Normal breath sounds.   Musculoskeletal:         General: Tenderness present. Normal range of motion.      Cervical back: Normal range of motion.   Skin:     General: Skin is warm.      Capillary Refill:  Capillary refill takes less than 2 seconds.      Findings: Bruising present.   Neurological:      General: No focal deficit present.      Mental Status: She is alert.   Psychiatric:         Mood and Affect: Mood normal.         Behavior: Behavior normal.         Procedures           ED Course                                           Medical Decision Making  Patient is a 80-year-old female with history of bronchitis, arthritis, cellulitis, hypothyroidism, osteopenia, osteoporosis who presents today with right foot pain following dropping a bottle to the top of her foot.  He denies numbness, tingling or loss of sensation.    Upon exam patient is awake and alert, nontoxic-appearing, appears in no acute distress, and is answering questions appropriately.  Lung sounds are clear and equal bilaterally.  Heart is normal rate and rhythm.  Mucous membranes are moist.  Patient reports right foot pain.  There is no edema, or erythema present.  There is mild bruising to the anterior surface of her foot.  She has a strong pedal pulse, and cap refills less than 2.  An x-ray was obtained and showed nothing acute.  We discussed return precautions.  I advised her to follow-up with podiatry if symptoms persist, and return to the ER with any new or worsening symptoms.    Amount and/or Complexity of Data Reviewed  Radiology: ordered.        Final diagnoses:   Right foot pain       ED Disposition  ED Disposition       ED Disposition   Discharge    Condition   Stable    Comment   --               Suman Reddy DPM  [address] [id number] [phone]    Schedule an appointment as soon as possible for a visit in 1 day      WILFRID Anderson DPM  [address] [address]  [phone]    Schedule an appointment as soon as possible for a visit in 1 day           Medication List      No changes were made to your prescriptions during this visit.

## 2025-07-07 ENCOUNTER — LAB (OUTPATIENT)
Dept: LAB | Facility: HOSPITAL | Age: 80
End: 2025-07-07
Payer: MEDICARE

## 2025-07-07 ENCOUNTER — TRANSCRIBE ORDERS (OUTPATIENT)
Dept: LAB | Facility: HOSPITAL | Age: 80
End: 2025-07-07
Payer: MEDICARE

## 2025-07-07 DIAGNOSIS — Z00.00 ROUTINE GENERAL MEDICAL EXAMINATION AT A HEALTH CARE FACILITY: ICD-10-CM

## 2025-07-07 DIAGNOSIS — Z00.00 ROUTINE GENERAL MEDICAL EXAMINATION AT A HEALTH CARE FACILITY: Primary | ICD-10-CM

## 2025-07-07 LAB
ALBUMIN SERPL-MCNC: 4.2 G/DL (ref 3.5–5.2)
ALBUMIN/GLOB SERPL: 2.2 G/DL
ALP SERPL-CCNC: 76 U/L (ref 39–117)
ALT SERPL W P-5'-P-CCNC: 9 U/L (ref 1–33)
ANION GAP SERPL CALCULATED.3IONS-SCNC: 8.7 MMOL/L (ref 5–15)
AST SERPL-CCNC: 24 U/L (ref 1–32)
BASOPHILS # BLD AUTO: 0.02 10*3/MM3 (ref 0–0.2)
BASOPHILS NFR BLD AUTO: 0.5 % (ref 0–1.5)
BILIRUB SERPL-MCNC: 0.2 MG/DL (ref 0–1.2)
BUN SERPL-MCNC: 16.7 MG/DL (ref 8–23)
BUN/CREAT SERPL: 19.9 (ref 7–25)
CALCIUM SPEC-SCNC: 8.6 MG/DL (ref 8.6–10.5)
CHLORIDE SERPL-SCNC: 106 MMOL/L (ref 98–107)
CHOLEST SERPL-MCNC: 199 MG/DL (ref 0–200)
CO2 SERPL-SCNC: 26.3 MMOL/L (ref 22–29)
CREAT SERPL-MCNC: 0.84 MG/DL (ref 0.57–1)
DEPRECATED RDW RBC AUTO: 43.2 FL (ref 37–54)
EGFRCR SERPLBLD CKD-EPI 2021: 71.2 ML/MIN/1.73
EOSINOPHIL # BLD AUTO: 0.14 10*3/MM3 (ref 0–0.4)
EOSINOPHIL NFR BLD AUTO: 3.4 % (ref 0.3–6.2)
ERYTHROCYTE [DISTWIDTH] IN BLOOD BY AUTOMATED COUNT: 12.4 % (ref 12.3–15.4)
GLOBULIN UR ELPH-MCNC: 1.9 GM/DL
GLUCOSE SERPL-MCNC: 83 MG/DL (ref 65–99)
HCT VFR BLD AUTO: 38.6 % (ref 34–46.6)
HDLC SERPL-MCNC: 82 MG/DL (ref 40–60)
HGB BLD-MCNC: 12.8 G/DL (ref 12–15.9)
IMM GRANULOCYTES # BLD AUTO: 0.01 10*3/MM3 (ref 0–0.05)
IMM GRANULOCYTES NFR BLD AUTO: 0.2 % (ref 0–0.5)
LDLC SERPL CALC-MCNC: 106 MG/DL (ref 0–100)
LDLC/HDLC SERPL: 1.29 {RATIO}
LYMPHOCYTES # BLD AUTO: 0.97 10*3/MM3 (ref 0.7–3.1)
LYMPHOCYTES NFR BLD AUTO: 23.8 % (ref 19.6–45.3)
MCH RBC QN AUTO: 31.1 PG (ref 26.6–33)
MCHC RBC AUTO-ENTMCNC: 33.2 G/DL (ref 31.5–35.7)
MCV RBC AUTO: 93.9 FL (ref 79–97)
MONOCYTES # BLD AUTO: 0.33 10*3/MM3 (ref 0.1–0.9)
MONOCYTES NFR BLD AUTO: 8.1 % (ref 5–12)
NEUTROPHILS NFR BLD AUTO: 2.6 10*3/MM3 (ref 1.7–7)
NEUTROPHILS NFR BLD AUTO: 64 % (ref 42.7–76)
NRBC BLD AUTO-RTO: 0 /100 WBC (ref 0–0.2)
PLATELET # BLD AUTO: 174 10*3/MM3 (ref 140–450)
PMV BLD AUTO: 10.7 FL (ref 6–12)
POTASSIUM SERPL-SCNC: 4.7 MMOL/L (ref 3.5–5.2)
PROT SERPL-MCNC: 6.1 G/DL (ref 6–8.5)
RBC # BLD AUTO: 4.11 10*6/MM3 (ref 3.77–5.28)
SODIUM SERPL-SCNC: 141 MMOL/L (ref 136–145)
T4 FREE SERPL-MCNC: 1.08 NG/DL (ref 0.92–1.68)
TRIGL SERPL-MCNC: 57 MG/DL (ref 0–150)
TSH SERPL DL<=0.05 MIU/L-ACNC: 4.53 UIU/ML (ref 0.27–4.2)
VLDLC SERPL-MCNC: 11 MG/DL (ref 5–40)
WBC NRBC COR # BLD AUTO: 4.07 10*3/MM3 (ref 3.4–10.8)

## 2025-07-07 PROCEDURE — 84443 ASSAY THYROID STIM HORMONE: CPT

## 2025-07-07 PROCEDURE — 84479 ASSAY OF THYROID (T3 OR T4): CPT

## 2025-07-07 PROCEDURE — 84439 ASSAY OF FREE THYROXINE: CPT

## 2025-07-07 PROCEDURE — 85025 COMPLETE CBC W/AUTO DIFF WBC: CPT

## 2025-07-07 PROCEDURE — 82672 ASSAY OF ESTROGEN: CPT

## 2025-07-07 PROCEDURE — 80053 COMPREHEN METABOLIC PANEL: CPT

## 2025-07-07 PROCEDURE — 80061 LIPID PANEL: CPT

## 2025-07-07 PROCEDURE — 36415 COLL VENOUS BLD VENIPUNCTURE: CPT

## 2025-07-08 LAB
ESTROGEN SERPL-MCNC: 75 PG/ML (ref 40–244)
FT4I SERPL CALC-MCNC: 2 (ref 1.2–4.9)
T3RU NFR SERPL: 28 % (ref 24–39)
T4 SERPL-MCNC: 7.1 UG/DL (ref 4.5–12)